# Patient Record
Sex: FEMALE | Race: WHITE | Employment: FULL TIME | URBAN - METROPOLITAN AREA
[De-identification: names, ages, dates, MRNs, and addresses within clinical notes are randomized per-mention and may not be internally consistent; named-entity substitution may affect disease eponyms.]

---

## 2017-03-13 ENCOUNTER — HOSPITAL ENCOUNTER (OUTPATIENT)
Dept: PHYSICAL THERAPY | Age: 27
Discharge: HOME | End: 2017-03-13
Payer: COMMERCIAL

## 2017-03-13 PROCEDURE — 97161 PT EVAL LOW COMPLEX 20 MIN: CPT | Performed by: PHYSICAL THERAPIST

## 2017-03-13 PROCEDURE — 97110 THERAPEUTIC EXERCISES: CPT | Performed by: PHYSICAL THERAPIST

## 2017-03-13 NOTE — PROGRESS NOTES
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] Treatment Diagnosis: Pain in right foot [M79.671]  Pain in right ankle [M25.571]  Ganglion, 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   ===========================================================================================  Assessment / key information:  The pt is a 22 y/o female who presents s/p Kidner Procedure to the R foot to remove extra tarsal, navicular bones and move tendons in foot. Pt states and MD writes on order to mold orthotic for pt to help preserve tendon integrity. The pt presents today with B axillary crutches, boot on R with shelf orthotic, and weight bears approx <10%. Called MD's office who state that pt is OK to begin weight bearing with boot,  Subtalar motion and sagittal plane exercises, avoid active inversion.   The pt presents with mild edema to R B malleoli, mild scar stiffness B, limited ROM, strength to R ankle grossly 3/5 without testing inversion, and LE is grossly 4/5. The pt would benefit from skilled PT services to progress to normal ambulation, increase strength to be able to resume prior ADL's.  ===========================================================================================  Eval Complexity: History LOW Complexity : Zero comorbidities / personal factors that will impact the outcome / POC;  Examination  LOW Complexity : 1-2 Standardized tests and measures addressing body structure, function, activity limitation and / or participation in recreation ; Presentation MEDIUM Complexity : Evolving with changing characteristics ; Decision Making MEDIUM Complexity : FOTO score of 26-74; Overall Complexity LOW   Problem List: pain affecting function, decrease ROM, decrease strength, edema affecting function, impaired gait/ balance, decrease ADL/ functional abilitiies, decrease activity tolerance and decrease flexibility/ joint mobility   Treatment Plan may include any combination of the following: Therapeutic exercise, Therapeutic activities, Neuromuscular re-education, Physical agent/modality, Gait/balance training, Manual therapy, Patient education, Self Care training and Functional mobility training. Orthotic molding/fitting. Patient / Family readiness to learn indicated by: asking questions, trying to perform skills and interest  Persons(s) to be included in education: patient (P)  Barriers to Learning/Limitations: None  Measures taken:    Patient Goal (s): Walk, move foot/ankle. Patient self reported health status: good  Rehabilitation Potential: good   Short Term Goals: To be accomplished in  2-3  weeks:  1. Independent  with HEP. 2. The patient will demonstrate 10 degrees increased ankle DF from -2 degrees to 8 degrees for improved gait pattern. 3. The patient to report maximum R foot pain to be a 2/10 and will be full weight bearing with boot and no crutches.   4. The patient will have increased functional score per FOTO  by 12  points for improved gait tolerance.  Long Term Goals: To be accomplished in  6  weeks:  1. The patient will have decreased pain to a 1-2/10 maximum to be able to walk for return to work  2. The patient will have WFL ROM left foot for ability to demonstrate heel strike and equal step length for a normal gait pattern. 3. The patient to have  Increased strength to > 4+/5 for community ambulation. 4. The patient will have an increased score on FOTO by 24  points for increased ADL tolerance with decreased pain. Frequency / Duration:   Patient to be seen  2  times per week for 6-8  weeks:  Patient / Caregiver education and instruction: self care, activity modification, brace/ splint application and exercises  G-Codes (GP):   Therapist Signature: Arsen Zafar PT Date: 2/50/7715   Certification Period:  Time: 11:20 AM   ===========================================================================================  I certify that the above Physical Therapy Services are being furnished while the patient is under my care. I agree with the treatment plan and certify that this therapy is necessary. Physician Signature:        Date:       Time:     Please sign and return to [facility] or you may fax the signed copy to [phone]. Thank you.

## 2017-03-13 NOTE — PROGRESS NOTES
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 NO    If yes, update Summary List   Subjective Functional Status/Changes:  []  No changes reported     See IE       OBJECTIVE  Modalities Rationale:     decrease edema, decrease inflammation and decrease pain to improve patient's ability to stand/walk with manageable pain. min [] Estim, type/location:                                      []  att     []  unatt     []  w/US     []  w/ice    []  w/heat    min []  Mechanical Traction: type/lbs                   []  pro   []  sup   []  int   []  cont    []  before manual    []  after manual    min []  Ultrasound, settings/location:      min []  Iontophoresis w/ dexamethasone, location:                                               []  take home patch       []  in clinic   10 min [x]  Ice     []  Heat    location/position: R ankle/ supine    min []  Vasopneumatic Device, press/temp:     min []  Other:    [] Skin assessment post-treatment (if applicable):    [x]  intact    []  redness- no adverse reaction     []redness  adverse reaction:          10 min Therapeutic Exercise:  [x]  See flow sheet   Rationale:      increase ROM and increase strength to improve the patients ability to ability to ambulate without crutches.           min Gait Training: Rationale:         min Patient Education:  YES  Reviewed HEP   []  Progressed/Changed HEP based on: Other Objective/Functional Measures:    See eval     Post Treatment Pain Level (on 0 to 10) scale:   0  / 10     ASSESSMENT  Assessment/Changes in Function:     See PN     []  See Progress Note/Recertification   Patient will continue to benefit from skilled PT services to modify and progress therapeutic interventions, address functional mobility deficits, address ROM deficits, address strength deficits, analyze and address soft tissue restrictions, analyze and cue movement patterns and instruct in home and community integration to attain remaining goals. Progress toward goals / Updated goals:    See pn     PLAN  [x]  Upgrade activities as tolerated YES Continue plan of care   []  Discharge due to :    [x]  Other: Progress weight bearing with boot on.  Mold orthotic     Therapist: Arsen aZfar PT    Date: 3/13/2017 Time: 2:46 PM     Future Appointments  Date Time Provider Lita Nguyen   3/15/2017 3:00 PM Eduardo Torres [place] [facility] AT [address]   3/20/2017 12:30 PM Eduardo Torres [facility] AT [address]

## 2017-03-15 ENCOUNTER — HOSPITAL ENCOUNTER (OUTPATIENT)
Dept: PHYSICAL THERAPY | Age: 27
Discharge: HOME | End: 2017-03-15
Payer: COMMERCIAL

## 2017-03-15 PROCEDURE — 97140 MANUAL THERAPY 1/> REGIONS: CPT

## 2017-03-15 PROCEDURE — 97110 THERAPEUTIC EXERCISES: CPT

## 2017-03-15 NOTE — PROGRESS NOTES
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     NO    If yes, update Summary List   Subjective Functional Status/Changes:  []  No changes reported     Functional improvements: its felt fine in the brace,   Functional impairments:  just really tight when its not, only putting around 20 pounds weight bearing        OBJECTIVE  Modalities Rationale:     decrease edema, decrease inflammation and decrease pain to improve patient's ability to prolong walking   min [] Estim, type/location:                                      []  att     []  unatt     []  w/US     []  w/ice    []  w/heat    min []  Mechanical Traction: type/lbs                   []  pro   []  sup   []  int   []  cont    []  before manual    []  after manual    min []  Ultrasound, settings/location:      min []  Iontophoresis w/ dexamethasone, location:                                               []  take home patch       []  in clinic   10 min [x]  Ice     []  Heat    location/position:     min []  Vasopneumatic Device, press/temp:     min []  Other:    [] Skin assessment post-treatment (if applicable):    []  intact    []  redness- no adverse reaction     []redness  adverse reaction:        15 min Manual Therapy: Technique:      [x] S/DTM [x]IASTM [x]PROM [] Passive Stretching   []manual TPR    []Jt manipulation:Gr I [] II []  III [] IV[] V[]  Treatment Area: Ankle PROM   Rationale:      decrease pain, increase ROM, increase tissue extensibility and decrease edema  to improve patient's ability to prolong walking    30 min Therapeutic Exercise:  [x]  See flow sheet   Rationale:      increase ROM, increase strength and improve coordination to improve the patients ability to prolong walking         min Neuromuscular Re-ed:    Rationale:    increase ROM to improve the patients ability to          min Therapeutic Activity:    Rationale:    increase ROM to improve the patients ability to         min Gait Training:    Rationale:         min Patient Education:  YES  Reviewed HEP   []  Progressed/Changed HEP based on: Other Objective/Functional Measures: Added ankle iso, gastroc stretch with strap, marble , hip 3 way      Post Treatment Pain Level (on 0 to 10) scale:   0  / 10     ASSESSMENT  Assessment/Changes in Function:     Patient tolerated treatment well today     []  See Progress Note/Recertification   Patient will continue to benefit from skilled PT services to modify and progress therapeutic interventions, address functional mobility deficits, address ROM deficits and address strength deficits to attain remaining goals.    Progress toward goals / Updated goals:    Established initial HEP     PLAN  []  Upgrade activities as tolerated YES Continue plan of care   []  Discharge due to :    []  Other:      Therapist: Yamila France PTA    Date: 3/15/2017 Time: 5:56 PM     Future Appointments  Date Time Provider Lita Nguyen   3/20/2017 12:30 PM Yamila France PTA [facility] AT [facility]   3/22/2017 3:00 PM Yamila France PTA [facility] AT [facility]

## 2017-03-20 ENCOUNTER — HOSPITAL ENCOUNTER (OUTPATIENT)
Dept: PHYSICAL THERAPY | Age: 27
Discharge: HOME | End: 2017-03-20
Payer: COMMERCIAL

## 2017-03-20 PROCEDURE — 97110 THERAPEUTIC EXERCISES: CPT

## 2017-03-20 PROCEDURE — 97140 MANUAL THERAPY 1/> REGIONS: CPT

## 2017-03-20 NOTE — PROGRESS NOTES
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     NO    If yes, update Summary List   Subjective Functional Status/Changes:  []  No changes reported     Functional improvements: its felt fine in the brace,   Functional impairments:  just really tight when its not, only putting around 20 pounds weight bearing        OBJECTIVE  Modalities Rationale:     decrease edema, decrease inflammation and decrease pain to improve patient's ability to prolong walking   min [] Estim, type/location:                                      []  att     []  unatt     []  w/US     []  w/ice    []  w/heat    min []  Mechanical Traction: type/lbs                   []  pro   []  sup   []  int   []  cont    []  before manual    []  after manual    min []  Ultrasound, settings/location:      min []  Iontophoresis w/ dexamethasone, location:                                               []  take home patch       []  in clinic   10 min [x]  Ice     []  Heat    location/position:     min []  Vasopneumatic Device, press/temp:     min []  Other:    [] Skin assessment post-treatment (if applicable):    []  intact    []  redness- no adverse reaction     []redness  adverse reaction:        15 min Manual Therapy: Technique:      [x] S/DTM [x]IASTM [x]PROM [] Passive Stretching []manual TPR    []Jt manipulation:Gr I [] II []  III [] IV[] V[]  Treatment Area: Ankle PROM   Rationale:      decrease pain, increase ROM, increase tissue extensibility and decrease edema  to improve patient's ability to prolong walking    25 min Therapeutic Exercise:  [x]  See flow sheet   Rationale:      increase ROM, increase strength and improve coordination to improve the patients ability to prolong walking         min Neuromuscular Re-ed:    Rationale:    increase ROM to improve the patients ability to          min Therapeutic Activity:    Rationale:    increase ROM to improve the patients ability to         min Gait Training:    Rationale:         min Patient Education:  YES  Reviewed HEP   []  Progressed/Changed HEP based on: Other Objective/Functional Measures: Added ankle iso, gastroc stretch with strap, marble , hip 3 way      Post Treatment Pain Level (on 0 to 10) scale:   0  / 10     ASSESSMENT  Assessment/Changes in Function:     Patient tolerated treatment well today     []  See Progress Note/Recertification   Patient will continue to benefit from skilled PT services to modify and progress therapeutic interventions, address functional mobility deficits, address ROM deficits and address strength deficits to attain remaining goals.    Progress toward goals / Updated goals:    Established initial HEP     PLAN  []  Upgrade activities as tolerated YES Continue plan of care   []  Discharge due to :    []  Other:      Therapist: Edwar Mcclendon PTA    Date: 3/20/2017 Time: 5:56 PM     Future Appointments  Date Time Provider Lita Nguyen   3/22/2017 3:00 PM Edwar Mcclendon PTA [facility] AT [facility]

## 2017-03-22 ENCOUNTER — HOSPITAL ENCOUNTER (OUTPATIENT)
Dept: PHYSICAL THERAPY | Age: 27
Discharge: HOME | End: 2017-03-22
Payer: COMMERCIAL

## 2017-03-22 PROCEDURE — 97140 MANUAL THERAPY 1/> REGIONS: CPT

## 2017-03-22 PROCEDURE — 97110 THERAPEUTIC EXERCISES: CPT

## 2017-03-22 NOTE — PROGRESS NOTES
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     NO    If yes, update Summary List   Subjective Functional Status/Changes:  []  No changes reported     Functional improvements: its felt fine in the brace,   Functional impairments:  just really tight when its not, only putting around 20 pounds weight bearing        OBJECTIVE  Modalities Rationale:     decrease edema, decrease inflammation and decrease pain to improve patient's ability to prolong walking   min [] Estim, type/location:                                      []  att     []  unatt     []  w/US     []  w/ice    []  w/heat    min []  Mechanical Traction: type/lbs                   []  pro   []  sup   []  int   []  cont    []  before manual    []  after manual    min []  Ultrasound, settings/location:      min []  Iontophoresis w/ dexamethasone, location:                                               []  take home patch       []  in clinic   10 min [x]  Ice     []  Heat    location/position:     min []  Vasopneumatic Device, press/temp:     min []  Other:    [] Skin assessment post-treatment (if applicable):    []  intact    []  redness- no adverse reaction     []redness  adverse reaction:        15 min Manual Therapy: Technique:      [x] S/DTM [x]IASTM [x]PROM [] Passive Stretching   []manual TPR    []Jt manipulation:Gr I [] II []  III [] IV[] V[]  Treatment Area: Ankle PROM   Rationale:      decrease pain, increase ROM, increase tissue extensibility and decrease edema  to improve patient's ability to prolong walking    20 min Therapeutic Exercise:  [x]  See flow sheet   Rationale:      increase ROM, increase strength and improve coordination to improve the patients ability to prolong walking         min Neuromuscular Re-ed:    Rationale:    increase ROM to improve the patients ability to          min Therapeutic Activity:    Rationale:    increase ROM to improve the patients ability to         min Gait Training:    Rationale:         min Patient Education:  YES  Reviewed HEP   []  Progressed/Changed HEP based on: Other Objective/Functional Measures:    Decreased swelling      Post Treatment Pain Level (on 0 to 10) scale:   0  / 10     ASSESSMENT  Assessment/Changes in Function:     Patient tolerated treatment well today     []  See Progress Note/Recertification   Patient will continue to benefit from skilled PT services to modify and progress therapeutic interventions, address functional mobility deficits, address ROM deficits and address strength deficits to attain remaining goals.    Progress toward goals / Updated goals:    Established initial HEP     PLAN  []  Upgrade activities as tolerated YES Continue plan of care   []  Discharge due to :    []  Other:      Therapist: Chloe Jacob PTA    Date: 3/22/2017 Time: 5:56 PM     Future Appointments  Date Time Provider Lita Nguyen   3/27/2017 2:30 PM Aron Fry [facility]   3/29/2017 2:00 PM Chloe Jacob PTA [facility] AT [facility]

## 2017-03-27 ENCOUNTER — HOSPITAL ENCOUNTER (OUTPATIENT)
Dept: PHYSICAL THERAPY | Age: 27
Discharge: HOME | End: 2017-03-27
Payer: COMMERCIAL

## 2017-03-27 PROCEDURE — 97762 HC PT ORTHOTIC/PROS CHECKOUT: CPT

## 2017-03-27 PROCEDURE — 97110 THERAPEUTIC EXERCISES: CPT

## 2017-03-27 PROCEDURE — 97140 MANUAL THERAPY 1/> REGIONS: CPT

## 2017-03-27 NOTE — PROGRESS NOTES
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     NO    If yes, update Summary List   Subjective Functional Status/Changes:  []  No changes reported     Functional improvements:usually wear [employer] Pegasus and will be   Functional impairments:  just really tight when its not, only putting around 20 pounds weight bearing        OBJECTIVE  Modalities Rationale:     decrease edema, decrease inflammation and decrease pain to improve patient's ability to prolong walking   min [] Estim, type/location:                                      []  att     []  unatt     []  w/US     []  w/ice    []  w/heat    min []  Mechanical Traction: type/lbs                   []  pro   []  sup   []  int   []  cont    []  before manual    []  after manual    min []  Ultrasound, settings/location:      min []  Iontophoresis w/ dexamethasone, location:                                               []  take home patch       []  in clinic   10 min [x]  Ice     []  Heat    location/position: supine    min []  Vasopneumatic Device, press/temp:     min []  Other:    [] Skin assessment post-treatment (if applicable):    []  intact    []  redness- no adverse reaction     []redness  adverse reaction:        15 min Manual Therapy: Technique:      [x] S/DTM [x]IASTM [x]PROM [] Passive Stretching   []manual TPR    []Jt manipulation:Gr I [] II []  III [] IV[] V[]  Treatment Area: Ankle PROM   Rationale:      decrease pain, increase ROM, increase tissue extensibility and decrease edema  to improve patient's ability to prolong walking    30 min Therapeutic Exercise:  [x]  See flow sheet   Rationale:      increase ROM, increase strength and improve coordination to improve the patients ability to prolong walking        15 min Patient Education:  YES  Reviewed HEP--Orthotic check out   []  Progressed/Changed HEP based on: Other Objective/Functional Measures:  Pt initial assessment for orthotic: order 3/4 length and full length blue density Vasyli for semi-custom heat molded orthotic. Decreased pain with AROM DF, minimal eversion with pain at [facility]. Post Treatment Pain Level (on 0 to 10) scale:   0  / 10     ASSESSMENT  Assessment/Changes in Function:     Pt with initial fitting for orthotic for R foot. []  See Progress Note/Recertification   Patient will continue to benefit from skilled PT services to modify and progress therapeutic interventions, address functional mobility deficits, address ROM deficits and address strength deficits to attain remaining goals.    Progress toward goals / Updated goals:    Progressed HEP     PLAN  []  Upgrade activities as tolerated YES Continue plan of care   []  Discharge due to :    []  Other:      Therapist: Briana Scott PT    Date: 3/27/2017 Time: 3 PM     Future Appointments  Date Time Provider Lita Nguyne   4/10/2017 12:00 PM Albino Ennis PTA [facility]   4/12/2017 9:30 AM Albino Ennis PTA [facility] AT [facility]   4/17/2017 10:30 AM Albino Ennis PTA [facility] AT [facility]   4/19/2017 9:00 AM Briana Scott PT [facility] AT [facility]

## 2017-03-29 ENCOUNTER — APPOINTMENT (OUTPATIENT)
Dept: PHYSICAL THERAPY | Age: 27
End: 2017-03-29
Payer: COMMERCIAL

## 2017-04-03 ENCOUNTER — HOSPITAL ENCOUNTER (OUTPATIENT)
Dept: PHYSICAL THERAPY | Age: 27
Discharge: HOME | End: 2017-04-03
Payer: COMMERCIAL

## 2017-04-03 PROCEDURE — 97110 THERAPEUTIC EXERCISES: CPT

## 2017-04-03 PROCEDURE — 97140 MANUAL THERAPY 1/> REGIONS: CPT

## 2017-04-03 NOTE — PROGRESS NOTES
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     NO    If yes, update Summary List   Subjective Functional Status/Changes:  []  No changes reported     Functional improvements: its felt fine in the brace,   Functional impairments:  just really tight when its not, only putting around 20 pounds weight bearing        OBJECTIVE  Modalities Rationale:     decrease edema, decrease inflammation and decrease pain to improve patient's ability to prolong walking   min [] Estim, type/location:                                      []  att     []  unatt     []  w/US     []  w/ice    []  w/heat    min []  Mechanical Traction: type/lbs                   []  pro   []  sup   []  int   []  cont    []  before manual    []  after manual    min []  Ultrasound, settings/location:      min []  Iontophoresis w/ dexamethasone, location:                                               []  take home patch       []  in clinic   10 min [x]  Ice     []  Heat    location/position:     min []  Vasopneumatic Device, press/temp:     min []  Other:    [] Skin assessment post-treatment (if applicable):    []  intact    []  redness- no adverse reaction     []redness  adverse reaction:        15 min Manual Therapy: Technique:      [x] S/DTM [x]IASTM [x]PROM [] Passive Stretching []manual TPR    []Jt manipulation:Gr I [] II []  III [] IV[] V[]  Treatment Area: Ankle PROM   Rationale:      decrease pain, increase ROM, increase tissue extensibility and decrease edema  to improve patient's ability to prolong walking    20 min Therapeutic Exercise:  [x]  See flow sheet   Rationale:      increase ROM, increase strength and improve coordination to improve the patients ability to prolong walking         min Neuromuscular Re-ed:    Rationale:    increase ROM to improve the patients ability to          min Therapeutic Activity:    Rationale:    increase ROM to improve the patients ability to         min Gait Training:    Rationale:         min Patient Education:  YES  Reviewed HEP   []  Progressed/Changed HEP based on: Other Objective/Functional Measures:    Decreased swelling      Post Treatment Pain Level (on 0 to 10) scale:   0  / 10     ASSESSMENT  Assessment/Changes in Function:     Patient tolerated treatment well today     []  See Progress Note/Recertification   Patient will continue to benefit from skilled PT services to modify and progress therapeutic interventions, address functional mobility deficits, address ROM deficits and address strength deficits to attain remaining goals.    Progress toward goals / Updated goals:    Established initial HEP     PLAN  []  Upgrade activities as tolerated YES Continue plan of care   []  Discharge due to :    []  Other:      Therapist: Cory Chin PTA    Date: 4/3/2017 Time: 5:56 PM     Future Appointments  Date Time Provider Lita Nguyen   4/5/2017 4:00  [facility], PT REHAB CENTER AT [facility]

## 2017-04-05 ENCOUNTER — APPOINTMENT (OUTPATIENT)
Dept: PHYSICAL THERAPY | Age: 27
End: 2017-04-05
Payer: COMMERCIAL

## 2017-04-06 ENCOUNTER — HOSPITAL ENCOUNTER (OUTPATIENT)
Dept: PHYSICAL THERAPY | Age: 27
Discharge: HOME | End: 2017-04-06
Payer: COMMERCIAL

## 2017-04-06 PROCEDURE — 97110 THERAPEUTIC EXERCISES: CPT

## 2017-04-06 PROCEDURE — 97140 MANUAL THERAPY 1/> REGIONS: CPT

## 2017-04-06 PROCEDURE — 97762 HC PT ORTHOTIC/PROS CHECKOUT: CPT

## 2017-04-06 NOTE — PROGRESS NOTES
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 NO    If yes, update Summary List   Subjective Functional Status/Changes:  []  No changes reported     Functional improvements:progressing well and saw MD, 1 week transition to ankle brace, 4-6 in brace prior to shoe. Functional impairments: restrictions in ROM, not back to work yet--may be back this weekend.        OBJECTIVE  Modalities Rationale:     decrease edema, decrease inflammation and decrease pain to improve patient's ability to prolong walking   min [] Estim, type/location:                                      []  att     []  unatt     []  w/US     []  w/ice    []  w/heat    min []  Mechanical Traction: type/lbs                   []  pro   []  sup   []  int   []  cont    []  before manual    []  after manual    min []  Ultrasound, settings/location:      min []  Iontophoresis w/ dexamethasone, location:                                               []  take home patch       []  in clinic   10 min [x]  Ice     []  Heat    location/position: supine    min []  Vasopneumatic Device, press/temp:     min []  Other:    [x] Skin assessment post-treatment (if applicable):    [x]  intact    [x]  redness- no adverse reaction     []redness  adverse reaction:        15 min Manual Therapy: Technique: [x] S/DTM [x]IASTM [x]PROM [] Passive Stretching   [x]manual TPR    []Jt manipulation:Gr I [] II []  III [] IV[] V[]  Treatment Area: Ankle PROM   Rationale:      decrease pain, increase ROM, increase tissue extensibility and decrease edema  to improve patient's ability to prolong walking, improve gt and standing mechanics    30 min Therapeutic Exercise:  [x]  See flow sheet   Rationale:      increase ROM, increase strength and improve coordination to improve the patients ability to prolong walking duration         min Neuromuscular Re-ed:    Rationale:    increase ROM to improve the patients ability to          min Therapeutic Activity:    Rationale:    increase ROM to improve the patients ability to         min Gait Training:    Rationale:        15 min Orthotic check out:  YES  Reviewed HEP   []  Progressed/Changed HEP based on: Other Objective/Functional Measures:    Issued blue full-length and 3/4 length for L, R foot respectively. Pt with ed on wear schedule. Advanced to bike and ed on transitioning to brace. Post Treatment Pain Level (on 0 to 10) scale:   0  / 10     ASSESSMENT  Assessment/Changes in Function:     Pt responded well to bike for ROM, orthotic fitting. []  See Progress Note/Recertification   Patient will continue to benefit from skilled PT services to modify and progress therapeutic interventions, address functional mobility deficits, address ROM deficits and address strength deficits to attain remaining goals. Progress toward goals / Updated goals:    Excellent progression with WB, limited significantly with scar tissue, ROM.      PLAN  [x]  Upgrade activities as tolerated YES Continue plan of care   []  Discharge due to :    []  Other:      Therapist: Juve Lee PT    Date: 4/6/2017 Time: 12 PM     Future Appointments  Date Time Provider Lita Nguyen   4/10/2017 12:00 PM Elsa Acevedo PTA REHAB CENTER AT [facility]   4/12/2017 9:30 AM Elsa Acevedo PTA REHAB CENTER AT [facility]   4/17/2017 10:30 AM tSella Pagan, PTA REHAB CENTER AT [facility]   4/19/2017 9:00 AM Shanique Chandra, PT REHAB CENTER AT [facility]

## 2017-04-10 ENCOUNTER — HOSPITAL ENCOUNTER (OUTPATIENT)
Dept: PHYSICAL THERAPY | Age: 27
Discharge: HOME | End: 2017-04-10
Payer: COMMERCIAL

## 2017-04-10 PROCEDURE — 97110 THERAPEUTIC EXERCISES: CPT

## 2017-04-10 PROCEDURE — 97140 MANUAL THERAPY 1/> REGIONS: CPT

## 2017-04-10 NOTE — PROGRESS NOTES
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     NO    If yes, update Summary List   Subjective Functional Status/Changes:  []  No changes reported     Functional improvements:  Brace is going well  Functional impairments:  just really tight when its not, only putting arourd 80%        OBJECTIVE  Modalities Rationale:     decrease edema, decrease inflammation and decrease pain to improve patient's ability to prolong walking   min [] Estim, type/location:                                      []  att     []  unatt     []  w/US     []  w/ice    []  w/heat    min []  Mechanical Traction: type/lbs                   []  pro   []  sup   []  int   []  cont    []  before manual    []  after manual    min []  Ultrasound, settings/location:      min []  Iontophoresis w/ dexamethasone, location:                                               []  take home patch       []  in clinic   10 min [x]  Ice     []  Heat    location/position:     min []  Vasopneumatic Device, press/temp:     min []  Other:    [] Skin assessment post-treatment (if applicable):    []  intact    []  redness- no adverse reaction     []redness  adverse reaction:        20 min Manual Therapy: Technique:      [x] S/DTM [x]IASTM [x]PROM [] Passive Stretching   []manual TPR    []Jt manipulation:Gr I [] II []  III [] IV[] V[]  Treatment Area: Ankle PROM   Rationale:      decrease pain, increase ROM, increase tissue extensibility and decrease edema  to improve patient's ability to prolong walking    40 min Therapeutic Exercise:  [x]  See flow sheet   Rationale:      increase ROM, increase strength and improve coordination to improve the patients ability to prolong walking         min Neuromuscular Re-ed:    Rationale:    increase ROM to improve the patients ability to          min Therapeutic Activity:    Rationale:    increase ROM to improve the patients ability to         min Gait Training:    Rationale:         min Patient Education:  YES  Reviewed HEP   []  Progressed/Changed HEP based on: Other Objective/Functional Measures:         Post Treatment Pain Level (on 0 to 10) scale:   0  / 10     ASSESSMENT  Assessment/Changes in Function:     Patient tolerated treatment well today     []  See Progress Note/Recertification   Patient will continue to benefit from skilled PT services to modify and progress therapeutic interventions, address functional mobility deficits, address ROM deficits and address strength deficits to attain remaining goals.    Progress toward goals / Updated goals:    Established initial HEP     PLAN  []  Upgrade activities as tolerated YES Continue plan of care   []  Discharge due to :    []  Other:      Therapist: Julio Aguilar PTA    Date: 4/10/2017 Time: 5:56 PM     Future Appointments  Date Time Provider Lita Nguyen   4/12/2017 9:30 AM Julio Aguilar PTA REHAB CENTER AT [facility]   4/17/2017 10:30 AM Julio Aguilar PTA REHAB CENTER AT [facility]   4/19/2017 9:00 AM [address] [place] [address], PT REHAB CENTER AT [facility]

## 2017-04-12 ENCOUNTER — HOSPITAL ENCOUNTER (OUTPATIENT)
Dept: PHYSICAL THERAPY | Age: 27
Discharge: HOME | End: 2017-04-12
Payer: COMMERCIAL

## 2017-04-12 PROCEDURE — 97112 NEUROMUSCULAR REEDUCATION: CPT

## 2017-04-12 PROCEDURE — 97110 THERAPEUTIC EXERCISES: CPT

## 2017-04-12 PROCEDURE — 97140 MANUAL THERAPY 1/> REGIONS: CPT

## 2017-04-17 ENCOUNTER — HOSPITAL ENCOUNTER (OUTPATIENT)
Dept: PHYSICAL THERAPY | Age: 27
Discharge: HOME | End: 2017-04-17
Payer: COMMERCIAL

## 2017-04-17 PROCEDURE — 97110 THERAPEUTIC EXERCISES: CPT

## 2017-04-17 PROCEDURE — 97140 MANUAL THERAPY 1/> REGIONS: CPT

## 2017-04-17 NOTE — PROGRESS NOTES
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     NO    If yes, update Summary List   Subjective Functional Status/Changes:  []  No changes reported     Functional improvements:  Brace is going well  Functional impairments:  just really tight when its not, only putting arourd 80%        OBJECTIVE  Modalities Rationale:     decrease edema, decrease inflammation and decrease pain to improve patient's ability to prolong walking   min [] Estim, type/location:                                      []  att     []  unatt     []  w/US     []  w/ice    []  w/heat    min []  Mechanical Traction: type/lbs                   []  pro   []  sup   []  int   []  cont    []  before manual    []  after manual    min []  Ultrasound, settings/location:      min []  Iontophoresis w/ dexamethasone, location:                                               []  take home patch       []  in clinic   10 min [x]  Ice     []  Heat    location/position:     min []  Vasopneumatic Device, press/temp:     min []  Other:    [] Skin assessment post-treatment (if applicable):    []  intact    []  redness- no adverse reaction     []redness  adverse reaction:        15 min Manual Therapy: Technique:      [x] S/DTM [x]IASTM [x]PROM [] Passive Stretching   []manual TPR    []Jt manipulation:Gr I [] II []  III [] IV[] V[]  Treatment Area: Ankle PROM   Rationale:      decrease pain, increase ROM, increase tissue extensibility and decrease edema  to improve patient's ability to prolong walking    35 min Therapeutic Exercise:  [x]  See flow sheet   Rationale:      increase ROM, increase strength and improve coordination to improve the patients ability to prolong walking         min Neuromuscular Re-ed:    Rationale:    increase ROM to improve the patients ability to          min Therapeutic Activity:    Rationale:    increase ROM to improve the patients ability to         min Gait Training:    Rationale:         min Patient Education:  YES  Reviewed HEP   []  Progressed/Changed HEP based on: Other Objective/Functional Measures: Added SR     Post Treatment Pain Level (on 0 to 10) scale:   0  / 10     ASSESSMENT  Assessment/Changes in Function:     Patient tolerated treatment well today     []  See Progress Note/Recertification   Patient will continue to benefit from skilled PT services to modify and progress therapeutic interventions, address functional mobility deficits, address ROM deficits and address strength deficits to attain remaining goals.    Progress toward goals / Updated goals:    Established initial HEP     PLAN  []  Upgrade activities as tolerated YES Continue plan of care   []  Discharge due to :    []  Other:      Therapist: Krystin Castellon PTA    Date: 4/17/2017 Time: 5:56 PM     Future Appointments  Date Time Provider Lita Nguyen   4/19/2017 9:00  [address], PT REHAB CENTER AT [facility]   4/25/2017 12:45 PM Katlin Robles, PT REHAB CENTER AT [facility]

## 2017-04-19 ENCOUNTER — HOSPITAL ENCOUNTER (OUTPATIENT)
Dept: PHYSICAL THERAPY | Age: 27
Discharge: HOME | End: 2017-04-19
Payer: COMMERCIAL

## 2017-04-19 PROCEDURE — 97110 THERAPEUTIC EXERCISES: CPT

## 2017-04-19 PROCEDURE — 97140 MANUAL THERAPY 1/> REGIONS: CPT

## 2017-04-19 PROCEDURE — 97112 NEUROMUSCULAR REEDUCATION: CPT

## 2017-04-19 NOTE — PROGRESS NOTES
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 Manual Augmented soft tissue massage and trigger point release   Right ankle PROM all planes     CURRENT STATUS  Patient is progressing well toward all set goals. Pt initial assessment for orthotic: order 3/4 length and full length blue density Vasyli for semi-custom heat molded orthotic. Previous Goals:  1. Independent with HEP. 2. The patient will demonstrate 10 degrees increased ankle DF from -2 degrees to 8 degrees for improved gait pattern. 3. The patient to report maximum R foot pain to be a 2/10 and will be full weight bearing with boot and no crutches. 4. The patient will have increased functional score per FOTO by 12  points for improved gait tolerance. Prior Level/Current Level:  1) Prior Level:     Current Level: states compliance and independence with HEP   Goal Met? yes  2) Prior Level: -2 degree DF   Current Level: 10 degrees DF   Goal Met? yes  3) Prior Level: painful and non weightbearing    Current Level: no pain reported, 100% weightbearing in brace   Goal Met? yes  4) Prior Level: 30   Current Level: n/a   Goal Met? n/a    New Goals to be achieved in __4__  weeks:  1. The patient will have decreased pain to a 1-2/10 maximum to be able to walk for return to work  2.  The patient will have WFL ROM left foot for ability to demonstrate heel strike and equal step length for a normal gait pattern. 3. The patient to have Increased strength to > 4+/5 for community ambulation. 4. The patient will have an increased score on FOTO by 24  points for increased ADL tolerance with decreased pain. RECOMMENDATIONS  Continue per current POC to assist with increasing function while returning to previous ADLs. 2-3x/week for 4 weeks  If you have any questions/comments please contact us directly at [phone]. Thank you for allowing us to assist in the care of your patient. Therapist Signature: Mitchael Dakin, PTA Date: 4/19/2017     Time: 12:15 PM   NOTE TO PHYSICIAN:  PLEASE COMPLETE THE ORDERS BELOW AND FAX TO   [facility] at [address]: [phone]. If you are unable to process this request in 24 hours please contact our office: [phone].    ___ I have read the above report and request that my patient continue as recommended.   ___ I have read the above report and request that my patient continue therapy with the following changes/special instructions:_________________________________________________________   ___ I have read the above report and request that my patient be discharged from therapy.      Physician Signature:        Date:       Time:

## 2017-04-19 NOTE — PROGRESS NOTES
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     NO    If yes, update Summary List   Subjective Functional Status/Changes:  []  No changes reported     Functional improvements:  Brace is going well  Functional impairments:  just really tight when its not, only putting arourd 80%        OBJECTIVE  Modalities Rationale:     decrease edema, decrease inflammation and decrease pain to improve patient's ability to prolong walking   min [] Estim, type/location:                                      []  att     []  unatt     []  w/US     []  w/ice    []  w/heat    min []  Mechanical Traction: type/lbs                   []  pro   []  sup   []  int   []  cont    []  before manual    []  after manual    min []  Ultrasound, settings/location:      min []  Iontophoresis w/ dexamethasone, location:                                               []  take home patch       []  in clinic    min [x]  Ice     []  Heat    location/position:     min []  Vasopneumatic Device, press/temp:     min []  Other:    [] Skin assessment post-treatment (if applicable):    []  intact    []  redness- no adverse reaction     []redness  adverse reaction:        15 min Manual Therapy: Technique:      [x] S/DTM [x]IASTM [x]PROM [] Passive Stretching   []manual TPR    []Jt manipulation:Gr I [] II []  III [] IV[] V[]  Treatment Area: Ankle PROM   Rationale:      decrease pain, increase ROM, increase tissue extensibility and decrease edema  to improve patient's ability to prolong walking    45 min Therapeutic Exercise:  [x]  See flow sheet   Rationale:      increase ROM, increase strength and improve coordination to improve the patients ability to prolong walking         min Neuromuscular Re-ed:    Rationale:    increase ROM to improve the patients ability to          min Therapeutic Activity:    Rationale:    increase ROM to improve the patients ability to         min Gait Training:    Rationale:         min Patient Education:  YES  Reviewed HEP   []  Progressed/Changed HEP based on: Other Objective/Functional Measures:    PROM WNL  See note     Post Treatment Pain Level (on 0 to 10) scale:   0  / 10     ASSESSMENT  Assessment/Changes in Function:     Patient tolerated treatment well today     []  See Progress Note/Recertification   Patient will continue to benefit from skilled PT services to modify and progress therapeutic interventions, address functional mobility deficits, address ROM deficits and address strength deficits to attain remaining goals.    Progress toward goals / Updated goals:    Established initial HEP     PLAN  []  Upgrade activities as tolerated YES Continue plan of care   []  Discharge due to :    []  Other:      Therapist: Ivy Tamez PTA    Date: 4/19/2017 Time: 5:56 PM     Future Appointments  Date Time Provider Lita Nguyen   4/25/2017 12:45 PM Spring Kohli, PT REHAB CENTER AT [facility]

## 2017-04-25 ENCOUNTER — HOSPITAL ENCOUNTER (OUTPATIENT)
Dept: PHYSICAL THERAPY | Age: 27
Discharge: HOME | End: 2017-04-25
Payer: COMMERCIAL

## 2017-04-25 PROCEDURE — 97110 THERAPEUTIC EXERCISES: CPT | Performed by: PHYSICAL THERAPIST

## 2017-04-25 PROCEDURE — 97140 MANUAL THERAPY 1/> REGIONS: CPT | Performed by: PHYSICAL THERAPIST

## 2017-04-25 NOTE — PROGRESS NOTES
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 NO    If yes, update Summary List   Subjective Functional Status/Changes:  []  No changes reported     Functional improvements: Improved walking  Functional impairments: pain to arch and she thinks it's due to the brace. Feels she's imporving. OBJECTIVE  Modalities Rationale:     decrease edema, decrease inflammation and decrease pain to improve patient's ability to improve ability to prolong walking.    min [] Estim, type/location:                                      []  att     []  unatt     []  w/US     []  w/ice    []  w/heat    min []  Mechanical Traction: type/lbs                   []  pro   []  sup   []  int   []  cont    []  before manual    []  after manual    min []  Ultrasound, settings/location:      min []  Iontophoresis w/ dexamethasone, location:                                               []  take home patch       []  in clinic   10 min [x]  Ice     []  Heat    location/position: R foot    min []  Vasopneumatic Device, press/temp:     min []  Other:    [] Skin assessment post-treatment (if applicable):    []  intact    []  redness- no adverse reaction     []redness  adverse reaction:        15 min Manual Therapy: Technique:      [x] S/DTM []IASTM [x]PROM [] Passive Stretching   []manual TPR    []Jt manipulation:Gr I [] II []  III [] IV[] V[]  Treatment Area: Ankle PROM, scar mobs   Rationale:      decrease pain, increase ROM, increase tissue extensibility and decrease edema  to improve patient's ability to prolong walking. 35 min Therapeutic Exercise:  [x]  See flow sheet   Rationale:      increase ROM and increase strength to improve the patients ability to prolong ablity to walk. min Patient Education:  YES  Reviewed HEP   []  Progressed/Changed HEP based on: Other Objective/Functional Measures:    Increased stability with balance exercises. Post Treatment Pain Level (on 0 to 10) scale:   0  / 10     ASSESSMENT  Assessment/Changes in Function:     The pt is able to perform all exercises without pain. Pain in arch 2/2 brace where air is pumped in to support her. Recommended she decrease that to tolerance. []  See Progress Note/Recertification   Patient will continue to benefit from skilled PT services to modify and progress therapeutic interventions, address functional mobility deficits, address ROM deficits, address strength deficits, analyze and address soft tissue restrictions, analyze and cue movement patterns and analyze and modify body mechanics/ergonomics to attain remaining goals. Progress toward goals / Updated goals:    Progressing towards all LTG's. PLAN  [x]  Upgrade activities as tolerated YES Continue plan of care   []  Discharge due to :    []  Other:      Therapist: Nirav Dempsey PT    Date: 4/25/2017 Time: 1:02 PM     No future appointments.

## 2017-05-02 ENCOUNTER — HOSPITAL ENCOUNTER (OUTPATIENT)
Dept: PHYSICAL THERAPY | Age: 27
Discharge: HOME | End: 2017-05-02
Payer: COMMERCIAL

## 2017-05-02 PROCEDURE — 97140 MANUAL THERAPY 1/> REGIONS: CPT | Performed by: PHYSICAL THERAPIST

## 2017-05-02 PROCEDURE — 97110 THERAPEUTIC EXERCISES: CPT | Performed by: PHYSICAL THERAPIST

## 2017-05-02 NOTE — PROGRESS NOTES
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 NO    If yes, update Summary List   Subjective Functional Status/Changes:  []  No changes reported     Functional improvements: Has been walking at work with brace and shoes and feels ok, adjusted her brace and no longer bothers her. Asks if she can start gentle Yoga. Functional impairments: unable to completely weight bear 100% of weight through foot. OBJECTIVE  Modalities Rationale:     decrease edema, decrease inflammation and decrease pain to improve patient's ability to prolong walking and increase weight bearing.    min [x] Estim, type/location:                                      []  att     []  unatt     []  w/US     []  w/ice    []  w/heat    min []  Mechanical Traction: type/lbs                   []  pro   []  sup   []  int   []  cont    []  before manual    []  after manual    min []  Ultrasound, settings/location:      min []  Iontophoresis w/ dexamethasone, location:                                               []  take home patch       []  in clinic   10 min [x]  Ice     []  Heat    location/position: R foot in hooklying    min []  Vasopneumatic Device, press/temp:     min []  Other:    [] Skin assessment post-treatment (if applicable):    []  intact    []  redness- no adverse reaction     []redness  adverse reaction:        15 min Manual Therapy: Technique:      [x] S/DTM [x]IASTM [x]PROM [] Passive Stretching   []manual TPR    []Jt manipulation:Gr I [] II []  III [] IV[] V[]  Treatment Area: Ankle PROM   Rationale:      decrease pain, increase ROM, increase tissue extensibility and decrease trigger points to improve patient's ability to  improve the patients ability to decrease pain for walking. 35 min Therapeutic Exercise:  [x]  See flow sheet   Rationale:      increase ROM, increase strength and improve coordination to improve the patients ability to  improve the patients ability to decrease pain for walking. min Patient Education:  YES  Reviewed HEP   []  Progressed/Changed HEP based on: Other Objective/Functional Measures:    Corrected technique with standing hip 4 way due to compensating with hip abduction. Post Treatment Pain Level (on 0 to 10) scale:   0  / 10     ASSESSMENT  Assessment/Changes in Function:     Improving ankle ROM, continues to minimize weight to R foot in stance phase. Pt to wear tennis shoes and brace with attempt at pain free Yoga and is to stop if there is pain. []  See Progress Note/Recertification   Patient will continue to benefit from skilled PT services to modify and progress therapeutic interventions, address functional mobility deficits, address ROM deficits, address strength deficits, analyze and address soft tissue restrictions, analyze and cue movement patterns and address imbalance/dizziness to attain remaining goals. Progress toward goals / Updated goals:    1. The patient will have decreased pain to a 1-2/10 maximum to be able to walk for return to work    MET  2. The patient will have WFL ROM left foot for ability to demonstrate heel strike and equal step length for a normal gait pattern. Progressing  3. The patient to have Increased strength to > 4+/5 for community ambulation. progressing  4.  The patient will have an increased score on FOTO by 24 points for increased ADL tolerance with decreased pain  Progressing     PLAN  [x]  Upgrade activities as tolerated YES Continue plan of care   []  Discharge due to :    []  Other:      Therapist: Bev Almanza PT    Date: 5/2/2017 Time: 2:07 PM     Future Appointments  Date Time Provider Lita Nguyen   5/3/2017 9:30 AM [address] [place] [address], PT [facility]HAB [facility]ENTER AT [facility]   5/9/2017 9:30 AM Bev Almanza PT [facility] AT [facility]

## 2017-05-03 ENCOUNTER — APPOINTMENT (OUTPATIENT)
Dept: PHYSICAL THERAPY | Age: 27
End: 2017-05-03
Payer: COMMERCIAL

## 2017-05-09 ENCOUNTER — HOSPITAL ENCOUNTER (OUTPATIENT)
Dept: PHYSICAL THERAPY | Age: 27
Discharge: HOME | End: 2017-05-09
Payer: COMMERCIAL

## 2017-05-09 PROCEDURE — 97140 MANUAL THERAPY 1/> REGIONS: CPT | Performed by: PHYSICAL THERAPIST

## 2017-05-09 PROCEDURE — 97110 THERAPEUTIC EXERCISES: CPT | Performed by: PHYSICAL THERAPIST

## 2017-05-09 NOTE — PROGRESS NOTES
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 NO    If yes, update Summary List   Subjective Functional Status/Changes:  []  No changes reported     Functional improvements: No pain, tolerates walking/standing 12 hour shift  Goes to see surgeon tomorrow.        OBJECTIVE  Modalities Rationale:     decrease edema, decrease inflammation and decrease pain to improve patient's ability to stand   min [] Estim, type/location:                                      []  att     []  unatt     []  w/US     []  w/ice    []  w/heat    min []  Mechanical Traction: type/lbs                   []  pro   []  sup   []  int   []  cont    []  before manual    []  after manual    min []  Ultrasound, settings/location:      min []  Iontophoresis w/ dexamethasone, location:                                               []  take home patch       []  in clinic   10 min [x]  Ice     []  Heat    location/position: R foot    min []  Vasopneumatic Device, press/temp:     min []  Other:    [] Skin assessment post-treatment (if applicable):    []  intact    []  redness- no adverse reaction     []redness  adverse reaction:        15 min Manual Therapy: Technique:      [x] S/DTM []IASTM [x]PROM [] Passive Stretching   []manual TPR    []Jt manipulation:Gr I [] II []  III [] IV[] V[]  Treatment Area: Ankle PROM, STM to ant tib, peroneal, gasatroc/soleus, arch. Rationale:      decrease pain, increase ROM, increase tissue extensibility and decrease trigger points to improve patient's ability to stand    45 min Therapeutic Exercise:  [x]  See flow sheet   Rationale:      increase ROM and increase strength to improve the patients ability to stand        min Patient Education:  YES  Reviewed HEP   []  Progressed/Changed HEP based on: Other Objective/Functional Measures:    Issued pt red tband for tband 4 way HEP. Reassessed and sent with pt letter for doctor. AROM DF=9*, PF=40*, inv=20*, EV=13*     Post Treatment Pain Level (on 0 to 10) scale:   0  / 10     ASSESSMENT  Assessment/Changes in Function:     The pt has gained increased ROM and strength. Demos good understanding of HEP. []  See Progress Note/Recertification   Patient will continue to benefit from skilled PT services to modify and progress therapeutic interventions, address functional mobility deficits, address ROM deficits, address strength deficits, analyze and address soft tissue restrictions, analyze and cue movement patterns and analyze and modify body mechanics/ergonomics to attain remaining goals. Progress toward goals / Updated goals:    I with HEP, gained increased DF.      PLAN  [x]  Upgrade activities as tolerated YES Continue plan of care   []  Discharge due to :    []  Other:      Therapist: Preet Morrissey, PT    Date: 5/9/2017 Time: 9:29 AM     Future Appointments  Date Time Provider Lita Nguyen   5/9/2017 9:30 AM Preet Morrissey, PT REHAB CENTER AT [facility]

## 2017-05-16 ENCOUNTER — APPOINTMENT (OUTPATIENT)
Dept: PHYSICAL THERAPY | Age: 27
End: 2017-05-16
Payer: COMMERCIAL

## 2017-05-18 ENCOUNTER — HOSPITAL ENCOUNTER (OUTPATIENT)
Dept: PHYSICAL THERAPY | Age: 27
Discharge: HOME | End: 2017-05-18
Payer: COMMERCIAL

## 2017-05-18 PROCEDURE — 97110 THERAPEUTIC EXERCISES: CPT

## 2017-05-18 PROCEDURE — 97112 NEUROMUSCULAR REEDUCATION: CPT

## 2017-05-18 PROCEDURE — 97140 MANUAL THERAPY 1/> REGIONS: CPT

## 2017-05-18 NOTE — PROGRESS NOTES
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     NO    If yes, update Summary List   Subjective Functional Status/Changes:  []  No changes reported     Saw surgeon who started the walk to run program and was able to progress activity per MD.       OBJECTIVE  Modalities Rationale:     decrease edema, decrease inflammation and decrease pain to improve patient's ability to stand   min [] Estim, type/location:                                      []  att     []  unatt     []  w/US     []  w/ice    []  w/heat    min []  Mechanical Traction: type/lbs                   []  pro   []  sup   []  int   []  cont    []  before manual    []  after manual    min []  Ultrasound, settings/location:      min []  Iontophoresis w/ dexamethasone, location:                                               []  take home patch       []  in clinic   10 min [x]  Ice     []  Heat    location/position: R foot    min []  Vasopneumatic Device, press/temp:     min []  Other:    [x] Skin assessment post-treatment (if applicable):    [x]  intact    [x]  redness- no adverse reaction     []redness  adverse reaction:        15 min Manual Therapy: Technique:      [x] S/DTM []IASTM [x]PROM [] Passive Stretching   []manual TPR    []Jt manipulation:Gr I [] II []  III [] IV[] V[]  Treatment Area: Ankle PROM, STM to ant tib, peroneal, gasatroc/soleus, arch. Rationale:      decrease pain, increase ROM, increase tissue extensibility and decrease trigger points to improve patient's ability to perform heel raise for reaching    30 min Therapeutic Exercise:  [x]  See flow sheet   Rationale:      increase ROM and increase strength to improve the patients ability to stand for work    15 min Neuromuscular Re-education:  [x]  See flow sheet   Rationale:      increase ROM and increase strength to improve the patients ability to balance for recreational activity      T/o tx min Patient Education:  YES  Reviewed HEP   []  Progressed/Changed HEP based on: Other Objective/Functional Measures:    Issued pt green tband for tband 4 way HEP. Added static balance for propriorception. See updated HEP. Post Treatment Pain Level (on 0 to 10) scale:   0  / 10     ASSESSMENT  Assessment/Changes in Function:     Pt challenged with EC activity, unable to perform HR without pain single leg modified for 80/20 for pt home program.   []  See Progress Note/Recertification   Patient will continue to benefit from skilled PT services to modify and progress therapeutic interventions, address functional mobility deficits, address ROM deficits, address strength deficits, analyze and address soft tissue restrictions, analyze and cue movement patterns and analyze and modify body mechanics/ergonomics to attain remaining goals. Progress toward goals / Updated goals:    I with HEP, gained increased DF. PLAN  [x]  Upgrade activities as tolerated YES Continue plan of care   []  Discharge due to :    []  Other:      Therapist: Rocky Pearce PT    Date: 5/18/2017 Time: 115pm     No future appointments.

## 2017-05-23 ENCOUNTER — HOSPITAL ENCOUNTER (OUTPATIENT)
Dept: PHYSICAL THERAPY | Age: 27
Discharge: HOME | End: 2017-05-23
Payer: COMMERCIAL

## 2017-05-23 PROCEDURE — 97110 THERAPEUTIC EXERCISES: CPT

## 2017-05-23 PROCEDURE — 97140 MANUAL THERAPY 1/> REGIONS: CPT

## 2017-05-23 PROCEDURE — 97112 NEUROMUSCULAR REEDUCATION: CPT

## 2017-05-23 NOTE — PROGRESS NOTES
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 NO    If yes, update Summary List   Subjective Functional Status/Changes:  []  No changes reported     Has been running as part of walk to run with less pain than she expected. Pain is less but balance is poor. OBJECTIVE  Modalities Rationale:     decrease edema, decrease inflammation and decrease pain to improve patient's ability to stand   min [] Estim, type/location:                                      []  att     []  unatt     []  w/US     []  w/ice    []  w/heat    min []  Mechanical Traction: type/lbs                   []  pro   []  sup   []  int   []  cont    []  before manual    []  after manual    min []  Ultrasound, settings/location:      min []  Iontophoresis w/ dexamethasone, location:                                               []  take home patch       []  in clinic   10 min [x]  Ice     []  Heat    location/position: R foot in supine with LE elevation.     min []  Vasopneumatic Device, press/temp:     min []  Other:    [x] Skin assessment post-treatment (if applicable):    [x]  intact    [x]  redness- no adverse reaction     []redness  adverse reaction:        15 min Manual Therapy: Technique:      [x] S/DTM []IASTM [x]PROM [] Passive Stretching   []manual TPR    [x]Jt manipulation:Gr I [x] II [x]  III [x] IV[x] V[]  Treatment Area:  GTE mobility, DF mobility, Ankle PROM, STM to ant tib, peroneal, gastroc/soleus, arch. Rationale:      decrease pain, increase ROM, increase tissue extensibility and decrease trigger points to improve patient's ability to perform heel raise for reaching    30 min Therapeutic Exercise:  [x]  See flow sheet   Rationale:      increase ROM and increase strength to improve the patients ability to stand for work    15 min Neuromuscular Re-education:  [x]  See flow sheet   Rationale:      increase ROM and increase strength to improve the patients ability to balance for recreational activity      T/o tx min Patient Education:  YES  Reviewed HEP   []  Progressed/Changed HEP based on: Other Objective/Functional Measures:    Ed on progression of dynamic balance activity. Able to add altered PIETRO for ankle strength and stability with foam and rail. Post Treatment Pain Level (on 0 to 10) scale:   0  / 10     ASSESSMENT  Assessment/Changes in Function:     Pt challenged with progression of balance activity. Pt with improving ROM. []  See Progress Note/Recertification   Patient will continue to benefit from skilled PT services to modify and progress therapeutic interventions, address functional mobility deficits, address ROM deficits, address strength deficits, analyze and address soft tissue restrictions, analyze and cue movement patterns and analyze and modify body mechanics/ergonomics to attain remaining goals. Progress toward goals / Updated goals:    I with progression of HEP, HHT with dynamic and static balance.      PLAN  [x]  Upgrade activities as tolerated YES Continue plan of care   []  Discharge due to :    []  Other:      Therapist: Latasha Caceres PT    Date: 5/23/2017 Time: 125 pm     Future Appointments  Date Time Provider Lita Nguyen   5/30/2017 9:30 AM Latasha Caceres PT REHAB [facility] AT [facility]   6/7/2017 9:30 AM Latahsa Caceres PT REHAB CENTER AT [facility] 6/14/2017 9:30 AM Jackie Garcia PT REHAB CENTER AT [facility]

## 2017-05-30 ENCOUNTER — HOSPITAL ENCOUNTER (OUTPATIENT)
Dept: PHYSICAL THERAPY | Age: 27
Discharge: HOME | End: 2017-05-30
Payer: COMMERCIAL

## 2017-05-30 PROCEDURE — 97140 MANUAL THERAPY 1/> REGIONS: CPT

## 2017-05-30 PROCEDURE — 97110 THERAPEUTIC EXERCISES: CPT

## 2017-05-30 PROCEDURE — 97112 NEUROMUSCULAR REEDUCATION: CPT

## 2017-05-30 NOTE — PROGRESS NOTES
PHYSICAL THERAPY - DAILY TREATMENT NOTE    Patient Name: Von Whitt        Date: 2017  : 1990   YES Patient  Verified  Visit #:   16(6)   of   8  Insurance: Payor: Cheyenne Like / Plan: [address] 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 NO    If yes, update Summary List   Subjective Functional Status/Changes:  []  No changes reported     Is progressing well in the walk to run program.  Running feels good, balance is the most limited still especially with SLS. OBJECTIVE  Modalities Rationale:     decrease edema, decrease inflammation and decrease pain to improve patient's ability to stand   min [] Estim, type/location:                                      []  att     []  unatt     []  w/US     []  w/ice    []  w/heat    min []  Mechanical Traction: type/lbs                   []  pro   []  sup   []  int   []  cont    []  before manual    []  after manual    min []  Ultrasound, settings/location:      min []  Iontophoresis w/ dexamethasone, location:                                               []  take home patch       []  in clinic   10 min [x]  Ice     []  Heat    location/position: R foot in supine with LE elevation.     min []  Vasopneumatic Device, press/temp:     min []  Other:    [x] Skin assessment post-treatment (if applicable):    [x]  intact    [x]  redness- no adverse reaction     []redness  adverse reaction:        15 min Manual Therapy: Technique:      [x] S/DTM []IASTM [x]PROM [] Passive Stretching   []manual TPR    [x]Jt manipulation:Gr I [x] II [x]  III [x] IV[x] V[]  Treatment Area:  GTE mobility, DF mobility, Ankle PROM, STM to ant tib, peroneal, gastroc/soleus, arch. Rationale:      decrease pain, increase ROM, increase tissue extensibility and decrease trigger points to improve patient's ability to perform heel raise for reaching    30 min Therapeutic Exercise:  [x]  See flow sheet   Rationale:      increase ROM and increase strength to improve the patients ability to stand for work    10 min Neuromuscular Re-education:  [x]  See flow sheet   Rationale:      increase ROM and increase strength to improve the patients ability to balance for recreational activity, running. T/o tx min Patient Education:  YES  Reviewed HEP   []  Progressed/Changed HEP based on: Other Objective/Functional Measures:  Pt HEP progression. with addition of lateral step downs. Pt progression with TRX single and double leg movements. SLS with EC < 3 seconds. Post Treatment Pain Level (on 0 to 10) scale:   0  / 10     ASSESSMENT  Assessment/Changes in Function:     Pt challenged with Single leg TRX squats and lunges  Pt with improving ROM. []  See Progress Note/Recertification   Patient will continue to benefit from skilled PT services to modify and progress therapeutic interventions, address functional mobility deficits, address ROM deficits, address strength deficits, analyze and address soft tissue restrictions, analyze and cue movement patterns and analyze and modify body mechanics/ergonomics to attain remaining goals. Progress toward goals / Updated goals:    I with progression of HEP to include lateral step downs. Added SLS with rebounder.   Added slant board stretch for DF and seated gastroc, soleus to improve ROM and running walk program.     PLAN  [x]  Upgrade activities as tolerated YES Continue plan of care   []  Discharge due to :    [x]  Other: Progress walk to run program.     Therapist: Vivian Ernst, PT Date: 5/30/2017 Time: 915am     Future Appointments  Date Time Provider Lita Nguyen   6/7/2017 9:30 AM Pamela Card, PT REHAB [facility]ENTER AT [facility]   6/14/2017 9:30 AM Pamela Card, PT REHAB [facility]ENTER AT [facility]

## 2017-06-07 ENCOUNTER — APPOINTMENT (OUTPATIENT)
Dept: PHYSICAL THERAPY | Age: 27
End: 2017-06-07
Payer: COMMERCIAL

## 2017-06-14 ENCOUNTER — HOSPITAL ENCOUNTER (OUTPATIENT)
Dept: PHYSICAL THERAPY | Age: 27
Discharge: HOME | End: 2017-06-14
Payer: COMMERCIAL

## 2017-06-14 PROCEDURE — 97140 MANUAL THERAPY 1/> REGIONS: CPT

## 2017-06-14 PROCEDURE — 97112 NEUROMUSCULAR REEDUCATION: CPT

## 2017-06-14 PROCEDURE — 97110 THERAPEUTIC EXERCISES: CPT

## 2017-06-14 NOTE — PROGRESS NOTES
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 NO    If yes, update Summary List   Subjective Functional Status/Changes:  []  No changes reported   Running 8 minutes, walking 3. Pt with report of increased progress since last visit with exception of her balance, does not feel like she has made as much progress. Noted she shifts to R when SLS on R.         OBJECTIVE  Modalities Rationale:     decrease edema, decrease inflammation and decrease pain to improve patient's ability to stand   min [] Estim, type/location:                                      []  att     []  unatt     []  w/US     []  w/ice    []  w/heat    min []  Mechanical Traction: type/lbs                   []  pro   []  sup   []  int   []  cont    []  before manual    []  after manual    min []  Ultrasound, settings/location:      min []  Iontophoresis w/ dexamethasone, location:                                               []  take home patch       []  in clinic   10 min [x]  Ice     []  Heat    location/position: R foot in supine with LE elevation.     min []  Vasopneumatic Device, press/temp:     min []  Other:    [x] Skin assessment post-treatment (if applicable):    [x]  intact    [x]  redness- no adverse reaction     []redness  adverse reaction:        20 min Manual Therapy: Technique:      [x] S/DTM []IASTM [x]PROM [] Passive Stretching   []manual TPR    [x]Jt manipulation:Gr I [x] II [x]  III [x] IV[x] V[]  Treatment Area:  GTE mobility, DF mobility for DF, Ankle PROM, STM to peroneal, gastroc/soleus, arch IASTM to same. Rationale:      decrease pain, increase ROM, increase tissue extensibility and decrease trigger points to improve patient's ability to perform single heel raise for reaching    30 min Therapeutic Exercise:  [x]  See flow sheet   Rationale:      increase ROM and increase strength to improve the patients ability to stand for work    10 min Neuromuscular Re-education:  [x]  See flow sheet   Rationale:      increase ROM and increase strength to improve the patients ability to balance for recreational activity, running. T/o tx min Patient Education:  YES  Reviewed HEP   []  Progressed/Changed HEP based on: Other Objective/Functional Measures:  Pt HEP progression. with clams, single leg stance on foam.  Bridges with progression to Unilateral.    SLS with EC 8 seconds. Post Treatment Pain Level (on 0 to 10) scale:   0  / 10     ASSESSMENT  Assessment/Changes in Function:   Noted mild hip drop with R LE stance, ed on use of support during SLS for reduction in compensation. Advanced HEP to include clams, ER, IR with RTB and IR/ER B hip stretching. []  See Progress Note/Recertification   Patient will continue to benefit from skilled PT services to modify and progress therapeutic interventions, address functional mobility deficits, address ROM deficits, address strength deficits, analyze and address soft tissue restrictions, analyze and cue movement patterns and analyze and modify body mechanics/ergonomics to attain remaining goals. Progress toward goals / Updated goals:    I with progression to gluteal stability of clams, bridges S/L.      PLAN  [x]  Upgrade activities as tolerated YES Continue plan of care   []  Discharge due to :    [x]  Other: Progress gluteal strengthening, ankle ROM x 1 visit, plan for D/C if all LTGs met.      Therapist: Jackie Garcia PT    Date: 6/14/2017 Time: 18 am     Future Appointments  Date Time Provider Lita Nguyen   6/21/2017 12:30 PM Jackie Garcia PT [facility]

## 2017-06-30 NOTE — PROGRESS NOTES
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 Manual Augmented soft tissue massage and trigger point release to B LE to improve ROM for function. Right ankle PROM all planes, STM and joint mobility, neuromuscular re-ed for proprioception. CURRENT STATUS  Patient is progressing well toward all set goals. Good response to orthotic intervention. She has returned to running and demonstrating good progress with walk to run program.     Previous Goals:  1. The patient will have decreased pain to a 1-2/10 maximum to be able to walk for return to work  2. The patient will have WFL ROM left foot for ability to demonstrate heel strike and equal step length for a normal gait pattern. 3. The patient to have Increased strength to > 4+/5 for community ambulation. 4. The patient will have an increased score on FOTO by 24  points for increased ADL tolerance with decreased pain.      Prior Level/Current Level:  1) Prior Level:  4   Current Level: 1-2/10   Goal Met? yes  2) Prior Level: -2 degree DF   Current Level: 12 degrees DF   Goal Met? yes  3) Prior Level: 3+/5   Current Level: 4+/5   Goal Met? yes  4) Prior Level: 30   Current Level: n/a   Goal Met? n/a  RECOMMENDATIONS  Discharge physical therapy at this time, making excellent progress or achieved all LTGs at this time. Will follow up with MD PRN. If you have any questions/comments please contact us directly at [phone]. Thank you for allowing us to assist in the care of your patient.     Therapist Signature: Raheem Rondon, PT Date: 6/30/2017   Reporting Period:   3/13/17-6/14/17 Time: 1230pm

## 2018-06-20 ENCOUNTER — HOSPITAL ENCOUNTER (OUTPATIENT)
Dept: PHYSICAL THERAPY | Age: 28
Discharge: HOME | End: 2018-06-20
Payer: COMMERCIAL

## 2018-06-20 PROCEDURE — 97161 PT EVAL LOW COMPLEX 20 MIN: CPT

## 2018-06-20 PROCEDURE — 97110 THERAPEUTIC EXERCISES: CPT

## 2018-06-20 NOTE — PROGRESS NOTES
[address]  [facility] AT [place]  [address] [facility], [address] [facility], [address] [place] [address] Ln - Phone: [phone]  Fax: [phone] / [address] [facility]  Patient Name: Kim Rhodes : 1990   Medical   Diagnosis: Iliotibial band syndrome, right leg [M76.31]  Pain in right knee [M25.561]  Pain in right ankle and joints of right foot [M25.571] Treatment Diagnosis: R lateral knee pain and R ankle pain   Onset Date:      Referral Source: Daryl Walker MD Start of Care [facility]): 2018   Prior Hospitalization: See medical history Provider #: [id number]   Prior Level of Function: Chronic R foot pain    Comorbidities: Asthma   Medications: Verified on Patient Summary List   The Plan of Care and following information is based on the information from the initial evaluation.   ==============================================================================  Assessment / key information:   Kim Rhodes is a 33 y.o. female with a chief c/o intermittent R lateral knee pain and medial ankle pain, up to 9/10. Pt reports surgery, in , to remove extra foot bone and cyst.  She has returned to previous level of activity, but now has medial R ankle pain in TC joint and lateral knee pain, increased with running and stairs. Pain though, is inconsistent. Meniscus tests were negative, as were valgus stress tests. She reports tenderness at R ITB insertion at the knee. She does have an increased valgus at her R knee joint. Her R glute medius is slightly weaker (4+/5), and she has a B forefoot varus (about 10 degrees) and a slight navicular drop, all of which could increase valgus stress on her R knee. SFMA found dysfunctional multi-segmental rotation, and eyes closed SLS. She lacks full B hip ER AROM (34/35 degrees L/R).   She also lacks B ankle DF AROM/PROM, particularly with her knees straight, with  and 9/17 degrees R/L, indicating decreased gastroc length. Eyes closed SLS was < 5 sec L and R. She lacks the ability to translate her knee past her toes in 1/2 kneel on the R, with < 2\" of ant translation past the great toe (> 4\" L). The patient would benefit from skilled physical therapy at this time.  ===========================================================================================  Eval Complexity: History MEDIUM  Complexity : 1-2 comorbidities / personal factors will impact the outcome/ POC ;  Examination  HIGH Complexity : 4+ Standardized tests and measures addressing body structure, function, activity limitation and / or participation in recreation ; Presentation MEDIUM Complexity : Evolving with changing characteristics ; Decision Making LOW Complexity : FOTO score of ; Overall Complexity LOW   Problem List: pain affecting function, decrease ROM, decrease strength, edema affecting function, impaired gait/ balance, decrease ADL/ functional abilitiies, decrease activity tolerance and decrease flexibility/ joint mobility   Treatment Plan may include any combination of the following: Therapeutic exercise, Therapeutic activities, Neuromuscular re-education, Physical agent/modality, Gait/balance training, Manual therapy, Patient education and Other: Custom Orthotic Fitting  Patient / Family readiness to learn indicated by: asking questions, trying to perform skills and interest  Persons(s) to be included in education: patient (P)  Barriers to Learning/Limitations: None  Measures taken:    Patient Goal (s): \"strengthen leg, avoid surgery, manage pain\"   Patient self reported health status: fair  Rehabilitation Potential: good   Short Term Goals: To be accomplished in  2  weeks:  1. Decrease max R knee and ankle pain to < 5/10  2. Pt compliant with HEP  3.  Obtain custom orthotics for the patient    Long Term Goals: To be accomplished in  4-5  weeks:  1.   All ADL's without R knee or ankle pain  2.  B SLS > 10 sec with eyes closed to indicate increased balance and stability  3. Pt able to run up to 3 miles without any R knee/ankle pain   4. Increase passive B ankle DF with knee straight, to > 20 degrees, to increase function  Frequency / Duration:   Patient to be seen 2-3  times per week for 4-8  weeks:  Patient / Caregiver education and instruction: self care, activity modification and exercises  Therapist Signature: Kay Mauricio PT, MDT Date: 5/40/8934   Certification Period: NA Time: 9:02 AM   ===========================================================================================  I certify that the above Physical Therapy Services are being furnished while the patient is under my care. I agree with the treatment plan and certify that this therapy is necessary. Physician Signature:        Date:       Time:     Please sign and return to [facility] or you may fax the signed copy to [phone]. Thank you.

## 2018-06-20 NOTE — PROGRESS NOTES
PHYSICAL THERAPY - DAILY TREATMENT NOTE    Patient Name: Ness Wilson        Date: 2018  : 1990   YES Patient  Verified  Visit #:     Insurance: Payor: Inez Rabago / Plan: [address] PT / Product Type: Commerical /      In time: 8 Out time: 9   Total Treatment Time: 60     Medicare Time Tracking (below)   Total Timed Codes (min):  - 1:1 Treatment Time:  -     TREATMENT AREA =  R knee/ankle    SUBJECTIVE    Pain Level (on 0 to 10 scale):  0  / 10   Medication Changes/New allergies or changes in medical history, any new surgeries or procedures? NO    If yes, update Summary List   Subjective Functional Status/Changes:  []  No changes reported     See eval          OBJECTIVE    Modality rationale:  -    min [] Estim, type:                                          []  att     []  unatt     []  w/US     []  w/ice    []  w/heat    min []  Mechanical Traction: type/lbs                                               []  pro   []  sup   []  int   []  cont    min []  Ultrasound, settings/location:      min []  Iontophoresis:  []  take home patch w/ dexamethazone    min                                []  in clinic w/ dexamethazone    min []  Ice     []  Heat     position:     min []  Other:      10 min Therapeutic Exercise:  [x]  See flow sheet   []  Other:      []  Added:     to improve (function):    []  Changed:     to improve (function):       min Therapeutic Activity:      5 min Manual Therapy: Manual rolling to R quad/ITB      min Gait Training:       min Patient Education:  YES  Reviewed HEP   []  Progressed/Changed HEP based on:         Other Objective/Functional Measures:    See eval     Post Treatment Pain Level (on 0 to 10) scale:   0  / 10     ASSESSMENT    []  See Progress Note/Recertification   Patient will continue to benefit from skilled therapy to address remaining functional deficits: see eval   Progress toward goals / Updated goals:    -     PLAN    [x]  Upgrade activities as tolerated YES Continue plan of care   []  Discharge due to :    []  Other:      Therapist: Steven Lopez PT    Date: 6/20/2018 Time: 9:00 AM

## 2018-06-22 ENCOUNTER — HOSPITAL ENCOUNTER (OUTPATIENT)
Dept: PHYSICAL THERAPY | Age: 28
Discharge: HOME | End: 2018-06-22
Payer: COMMERCIAL

## 2018-06-22 PROCEDURE — 97140 MANUAL THERAPY 1/> REGIONS: CPT

## 2018-06-22 PROCEDURE — 97110 THERAPEUTIC EXERCISES: CPT

## 2018-06-22 NOTE — PROGRESS NOTES
PHYSICAL THERAPY - DAILY TREATMENT NOTE    Patient Name: Johnnie Sanford        Date: 2018  : 1990    Patient  Verified: YES  Visit #:   2   of   12  Insurance: Payor: John Reyez / Plan: [address] PT / Product Type: Commerical /      In time: 8:55 Out time: 10   Total Treatment Time: 65     Medicare Time Tracking (below)   Total Timed Codes (min):  - 1:1 Treatment Time:  -     TREATMENT AREA/ DIAGNOSIS = Iliotibial band syndrome, right leg [M76.31]  Pain in right knee [M25.561]  Pain in right ankle and joints of right foot [M25.571]    SUBJECTIVE  Pain Level (on 0 to 10 scale):  0  / 10   Medication Changes/New allergies or changes in medical history, any new surgeries or procedures?     NO    If yes, update Summary List   Subjective Functional Status/Changes:  []  No changes reported     Functional improvement no pain   Functional limitation       OBJECTIVE  Modalities Rationale:     decrease edema, decrease inflammation and decrease pain to improve patient's ability to perform ADLs without pain   min [] Estim, type/location:                                      []  att     []  unatt     []  w/US     []  w/ice    []  w/heat    min []  Mechanical Traction: type/lbs                   []  pro   []  sup   []  int   []  cont    []  before manual    []  after manual    min []  Ultrasound, settings/location:      min []  Iontophoresis w/ dexamethasone, location:                                               []  take home patch       []  in clinic   10 min [x]  Ice     []  Heat    location/position: R ankle and R knee    min []  Vasopneumatic Device, press/temp:     min []  Other:    [x] Skin assessment post-treatment (if applicable):    [x]  intact    [x]  redness- no adverse reaction     []redness  adverse reaction:        5 min Manual Therapy: Passive rolling to R ITB and glutes   Rationale:      decrease pain, increase ROM and increase tissue extensibility to improve patient's ability to perform ADLs without pain    50 min Therapeutic Exercise:  [x]  See flow sheet   Rationale:      increase ROM, increase strength and improve balance to improve the patients ability to perform ADLs without pain          min Patient Education:  Yes    [x] Reviewed HEP   []  Progressed/Changed HEP based on: Other Objective/Functional Measures:  Initiated LE strengthening program, with glute prep, WGS, PREs and balance acitivites   Post Treatment Pain Level (on 0 to 10) scale:   1  / 10     ASSESSMENT  Assessment/Changes in Function:     Pt with mild R knee pain at end of second set of squats       []  See Progress Note/Recertification   Patient will continue to benefit from skilled PT services to modify and progress therapeutic interventions, address functional mobility deficits, address ROM deficits, address strength deficits, analyze and address soft tissue restrictions, analyze and cue movement patterns, analyze and modify body mechanics/ergonomics and assess and modify postural abnormalities to attain remaining goals.    Progress toward goals / Updated goals:         PLAN  [x]  Upgrade activities as tolerated YES Continue plan of care   []  Discharge due to :    []  Other:      Therapist: Ferdinand France PT    Date: 6/22/2018 Time: 10:24 AM        Future Appointments  Date Time Provider Lita Nguyen   6/25/2018 8:00 AM Maciej Antony V, PTA [facility] AT [facility]   6/27/2018 8:30 AM Arlette Chin PT [facility]   7/3/2018 8:00 AM Ferdinand France PT [facility]   7/5/2018 8:00 AM Ferdinand France PT [facility]   7/10/2018 8:30 AM Ferdinand France PT [facility]   7/12/2018 8:30 AM Ferdinand France PT [facility] AT [facility]

## 2018-06-25 ENCOUNTER — HOSPITAL ENCOUNTER (OUTPATIENT)
Dept: PHYSICAL THERAPY | Age: 28
Discharge: HOME | End: 2018-06-25
Payer: COMMERCIAL

## 2018-06-25 PROCEDURE — 97140 MANUAL THERAPY 1/> REGIONS: CPT

## 2018-06-25 PROCEDURE — 97110 THERAPEUTIC EXERCISES: CPT

## 2018-06-25 NOTE — PROGRESS NOTES
PHYSICAL THERAPY - DAILY TREATMENT NOTE    Patient Name: Andreea Rankin        Date: 2018  : 1990    Patient  Verified: YES  Visit #:   3   of   12  Insurance: Payor: Severiano Clark / Plan: [address] PT / Product Type: Commerical /      In time: 7:55 Out time: 8:50   Total Treatment Time: 65     Medicare Time Tracking (below)   Total Timed Codes (min):  - 1:1 Treatment Time:  -     TREATMENT AREA/ DIAGNOSIS = Iliotibial band syndrome, right leg [M76.31]  Pain in right knee [M25.561]  Pain in right ankle and joints of right foot [M25.571]    SUBJECTIVE  Pain Level (on 0 to 10 scale):  0  / 10   Medication Changes/New allergies or changes in medical history, any new surgeries or procedures? NO    If yes, update Summary List   Subjective Functional Status/Changes:  []  No changes reported     \"Sore after last visit. \"       OBJECTIVE  Modalities Rationale:     decrease edema, decrease inflammation and decrease pain to improve patient's ability to perform ADLs without pain   min [] Estim, type/location:                                      []  att     []  unatt     []  w/US     []  w/ice    []  w/heat    min []  Mechanical Traction: type/lbs                   []  pro   []  sup   []  int   []  cont    []  before manual    []  after manual    min []  Ultrasound, settings/location:      min []  Iontophoresis w/ dexamethasone, location:                                               []  take home patch       []  in clinic   PD min [x]  Ice     []  Heat    location/position: R ankle and R knee    min []  Vasopneumatic Device, press/temp:     min []  Other:    [x] Skin assessment post-treatment (if applicable):    [x]  intact    [x]  redness- no adverse reaction     []redness  adverse reaction:        10 min Manual Therapy: Passive rolling to R ITB and glutes   Rationale:      decrease pain, increase ROM and increase tissue extensibility to improve patient's ability to perform ADLs without pain    55 min Therapeutic Exercise:  [x]  See flow sheet   Rationale:      increase ROM, increase strength and improve balance to improve the patients ability to perform ADLs without pain     X min Patient Education:  Yes    [x] Reviewed HEP   []  Progressed/Changed HEP based on:   Provided bands to progress clams at home. Other Objective/Functional Measures: Added YTB to clams. Post Treatment Pain Level (on 0 to 10) scale:   0-1/ 10 (sore)     ASSESSMENT  Assessment/Changes in Function:     No sx exacerbation with TE.      []  See Progress Note/Recertification   Patient will continue to benefit from skilled PT services to modify and progress therapeutic interventions, address functional mobility deficits, address ROM deficits, address strength deficits, analyze and address soft tissue restrictions, analyze and cue movement patterns, analyze and modify body mechanics/ergonomics and assess and modify postural abnormalities to attain remaining goals.    Progress toward goals / Updated goals:    Began HEP     PLAN  [x]  Upgrade activities as tolerated YES Continue plan of care   []  Discharge due to :    []  Other:      Therapist: Charity Rader PTA    Date: 6/25/2018 Time: 10:24 AM        Future Appointments  Date Time Provider Lita Nguyen   6/25/2018 8:00 AM Cameron MATOS PTA [facility]   6/27/2018 8:30 AM Reji Resendiz PT [facility]   7/3/2018 8:00 AM Russ Rosas PT [facility]   7/5/2018 8:00 AM Russ Rosas PT [facility]   7/10/2018 8:30 AM Russ Rosas PT [facility]   7/12/2018 8:30 AM Russ Rosas, PT [facility] AT [facility]

## 2018-06-27 ENCOUNTER — HOSPITAL ENCOUNTER (OUTPATIENT)
Dept: PHYSICAL THERAPY | Age: 28
Discharge: HOME | End: 2018-06-27
Payer: COMMERCIAL

## 2018-06-27 PROCEDURE — 97140 MANUAL THERAPY 1/> REGIONS: CPT

## 2018-06-27 PROCEDURE — 97110 THERAPEUTIC EXERCISES: CPT

## 2018-06-27 NOTE — PROGRESS NOTES
PHYSICAL THERAPY - DAILY TREATMENT NOTE    Patient Name: Chelly Madrid        Date: 2018  : 1990   YES Patient  Verified  Visit #:   4   of   12  Insurance: Payor: Hope Lopez / Plan: [address] / Product Type: Commerical /      In time: 830 Out time: 930   Total Treatment Time: 60     Medicare Time Tracking (below)   Total Timed Codes (min):   1:1 Treatment Time:       TREATMENT AREA =  Iliotibial band syndrome, right leg [M76.31]  Pain in right knee [M25.561]  Pain in right ankle and joints of right foot [M25.571]    SUBJECTIVE  Pain Level (on 0 to 10 scale):  0-3(ankle)  / 10   Medication Changes/New allergies or changes in medical history, any new surgeries or procedures? NO    If yes, update Summary List   Subjective Functional Status/Changes:  []  No changes reported     My knee  Doesn't hurt but my foot started            OBJECTIVE  Modalities Rationale:     PD   min [] Estim, type/location:                                      []  att     []  unatt     []  w/US     []  w/ice    []  w/heat    min []  Mechanical Traction: type/lbs                   []  pro   []  sup   []  int   []  cont    []  before manual    []  after manual    min []  Ultrasound, settings/location:      min []  Iontophoresis w/ dexamethasone, location:                                               []  take home patch       []  in clinic    min []  Ice     []  Heat    location/position:     min []  Vasopneumatic Device, press/temp:     min []  Other:    [] Skin assessment post-treatment (if applicable):    []  intact    []  redness- no adverse reaction     []redness  adverse reaction:        50 min Therapeutic Exercise:  [x]  See flow sheet   Rationale:      increase ROM, increase strength and improve coordination to improve the patients ability to perform ADL's and work related activities without increased pain     10 min Manual Therapy: DTM to R ITB followed by rolling to R ITB.    Rationale:      decrease pain, increase ROM and increase tissue extensibility to improve patient's ability to tolerate ADL's and walking without increased pain       min Patient Education:  YES  Reviewed HEP   []  Progressed/Changed HEP based on: Other Objective/Functional Measures:         Post Treatment Pain Level (on 0 to 10) scale:   0  / 10     ASSESSMENT  Assessment/Changes in Function:     Fair tolerance to MT. Continues to have R ITBand tightness. Continues to have decreased hip strength affecting mechanics and knee and ankle     []  See Progress Note/Recertification   Patient will continue to benefit from skilled PT services to modify and progress therapeutic interventions, address functional mobility deficits, address ROM deficits, address strength deficits, analyze and address soft tissue restrictions, analyze and cue movement patterns, analyze and modify body mechanics/ergonomics and assess and modify postural abnormalities to attain remaining goals. Progress toward goals / Updated goals:     All STG's met     PLAN  [x]  Upgrade activities as tolerated YES Continue plan of care   []  Discharge due to :    []  Other:      Therapist: Candice Ann PT    Date: 6/27/2018 Time: 8:24 AM     Future Appointments  Date Time Provider Lita Nguyen   6/27/2018 8:30 AM Candice Ann PT [facility] AT [facility]   7/3/2018 8:00 AM Tanna Markham PT [facility] AT [facility]   7/5/2018 8:00 AM Tanna Markham PT [facility] AT [facility]   7/10/2018 8:30 AM Tanna Markham PT [facility] AT [facility]   7/12/2018 8:30 AM Tanna Markham PT RE[facility] AT [facility]

## 2018-07-03 ENCOUNTER — HOSPITAL ENCOUNTER (OUTPATIENT)
Dept: PHYSICAL THERAPY | Age: 28
Discharge: HOME | End: 2018-07-03
Payer: COMMERCIAL

## 2018-07-03 PROCEDURE — 97110 THERAPEUTIC EXERCISES: CPT

## 2018-07-03 PROCEDURE — 97140 MANUAL THERAPY 1/> REGIONS: CPT

## 2018-07-03 NOTE — PROGRESS NOTES
PHYSICAL THERAPY - DAILY TREATMENT NOTE    Patient Name: Pacheco Lerma        Date: 7/3/2018  : 1990    Patient  Verified: YES  Visit #:      of   12  Insurance: Payor: Laurel Sandra / Plan: [address] / Product Type: Commerical /      In time: 8 Out time: 9:10   Total Treatment Time: 70     Medicare Time Tracking (below)   Total Timed Codes (min):  - 1:1 Treatment Time:  -     TREATMENT AREA/ DIAGNOSIS = Iliotibial band syndrome, right leg [M76.31]  Pain in right knee [M25.561]  Pain in right ankle and joints of right foot [M25.571]    SUBJECTIVE  Pain Level (on 0 to 10 scale):  2  / 10   Medication Changes/New allergies or changes in medical history, any new surgeries or procedures?     NO    If yes, update Summary List   Subjective Functional Status/Changes:  []  No changes reported     Functional improvement  Less knee pain  Functional limitation more foot pain with work      OBJECTIVE  Modalities Rationale:     decrease edema, decrease inflammation and decrease pain to improve patient's ability to perform ADLs without pain   min [] Estim, type/location:                                      []  att     []  unatt     []  w/US     []  w/ice    []  w/heat    min []  Mechanical Traction: type/lbs                   []  pro   []  sup   []  int   []  cont    []  before manual    []  after manual    min []  Ultrasound, settings/location:      min []  Iontophoresis w/ dexamethasone, location:                                               []  take home patch       []  in clinic   10 min [x]  Ice     []  Heat    location/position: R ankle, R knee    min []  Vasopneumatic Device, press/temp:     min []  Other:    [x] Skin assessment post-treatment (if applicable):    [x]  intact    [x]  redness- no adverse reaction     []redness  adverse reaction:        10 min Manual Therapy: Manual rolling of R ITB/lateral leg, GD IV AP mobs to TC joint   Rationale:      decrease pain, increase ROM and increase tissue extensibility to improve patient's ability to perform ADLs without pain    50 min Therapeutic Exercise:  [x]  See flow sheet   Rationale:      increase ROM, increase strength, improve balance and increase proprioception to improve the patients ability to perform ADLs without pain          min Patient Education:  Yes    [x] Reviewed HEP   []  Progressed/Changed HEP based on: Other Objective/Functional Measures:    Pt with less lateral knee pain, but increased R ankle pain  R ankle pain not increased with therapy, tender in medial/ant TC joint line  No pain with strees to medial ankle ligaments  Progressed PREs   Post Treatment Pain Level (on 0 to 10) scale:   1  / 10     ASSESSMENT  Assessment/Changes in Function:     Pt progressing well with less knee pain  R ankle with increased pain, but not provoked with PT  Progressed to SLS on foam   []  See Progress Note/Recertification   Patient will continue to benefit from skilled PT services to modify and progress therapeutic interventions, address functional mobility deficits, address ROM deficits, address strength deficits, analyze and address soft tissue restrictions, analyze and cue movement patterns, analyze and modify body mechanics/ergonomics, assess and modify postural abnormalities and address imbalance/dizziness to attain remaining goals.    Progress toward goals / Updated goals:    Less R knee pain ,improved balance     PLAN  [x]  Upgrade activities as tolerated YES Continue plan of care   []  Discharge due to :    []  Other:      Therapist: Gail Noland PT    Date: 7/3/2018 Time: 10:10 AM        Future Appointments  Date Time Provider Lita Nguyen   7/5/2018 8:00 AM Gail Noland PT [facility]   7/10/2018 8:30 AM Gail Noland PT [facility]   7/12/2018 8:30 AM Gail Noland PT [facility]

## 2018-07-05 ENCOUNTER — APPOINTMENT (OUTPATIENT)
Dept: PHYSICAL THERAPY | Age: 28
End: 2018-07-05
Payer: COMMERCIAL

## 2018-07-10 ENCOUNTER — HOSPITAL ENCOUNTER (OUTPATIENT)
Dept: PHYSICAL THERAPY | Age: 28
Discharge: HOME | End: 2018-07-10
Payer: COMMERCIAL

## 2018-07-10 PROCEDURE — 97140 MANUAL THERAPY 1/> REGIONS: CPT

## 2018-07-10 PROCEDURE — 97110 THERAPEUTIC EXERCISES: CPT

## 2018-07-10 NOTE — PROGRESS NOTES
PHYSICAL THERAPY - DAILY TREATMENT NOTE    Patient Name: Kayla Maier        Date: 7/10/2018  : 1990    Patient  Verified: YES  Visit #:   6   of   12  Insurance: Payor: Felicita Bundy / Plan: [address] / Product Type: Commerical /      In time: 8:30 Out time: 9:30   Total Treatment Time: 60     Medicare Time Tracking (below)   Total Timed Codes (min):  50 1:1 Treatment Time:  50     TREATMENT AREA/ DIAGNOSIS = Iliotibial band syndrome, right leg [M76.31]  Pain in right knee [M25.561]  Pain in right ankle and joints of right foot [M25.571]    SUBJECTIVE  Pain Level (on 0 to 10 scale):  0  / 10   Medication Changes/New allergies or changes in medical history, any new surgeries or procedures?     NO    If yes, update Summary List   Subjective Functional Status/Changes:  []  No changes reported     Functional improvement no ankle or knee pain after last PT session   Functional limitation R ankle pain with work      OBJECTIVE  Modalities Rationale:     decrease edema, decrease inflammation and decrease pain to improve patient's ability to perform ADLs without pain   min [] Estim, type/location:                                      []  att     []  unatt     []  w/US     []  w/ice    []  w/heat    min []  Mechanical Traction: type/lbs                   []  pro   []  sup   []  int   []  cont    []  before manual    []  after manual    min []  Ultrasound, settings/location:      min []  Iontophoresis w/ dexamethasone, location:                                               []  take home patch       []  in clinic   10 min [x]  Ice     []  Heat    location/position: R     min []  Vasopneumatic Device, press/temp:     min []  Other:    [x] Skin assessment post-treatment (if applicable):    [x]  intact    [x]  redness- no adverse reaction     []redness  adverse reaction:        5 min Manual Therapy: Manual rolling to R ITB/quad, GD IV AP mobs to T/C joint    Rationale:      decrease pain, increase ROM and increase tissue extensibility to improve patient's ability to perform ADLs without pain    45 min Therapeutic Exercise:  [x]  See flow sheet   Rationale:      increase ROM, increase strength, improve balance and increase proprioception to improve the patients ability to perform ADLs without pain          min Patient Education:  Yes    [x] Reviewed HEP   []  Progressed/Changed HEP based on: Other Objective/Functional Measures:    Pt with no knee or ankle pain after last session  Pain with working on hard floors   Post Treatment Pain Level (on 0 to 10) scale:   0  / 10     ASSESSMENT  Assessment/Changes in Function:     Pt progressing well   Difficulty with dynamic SLS       []  See Progress Note/Recertification   Patient will continue to benefit from skilled PT services to modify and progress therapeutic interventions, address functional mobility deficits, address ROM deficits, address strength deficits, analyze and address soft tissue restrictions, analyze and cue movement patterns and analyze and modify body mechanics/ergonomics to attain remaining goals.    Progress toward goals / Updated goals:    Progressed to Eyes closed SLS     PLAN  [x]  Upgrade activities as tolerated YES Continue plan of care   []  Discharge due to :    []  Other:      Therapist: Joel Martell PT    Date: 7/10/2018 Time: 9:35 AM        Future Appointments  Date Time Provider Lita Nguyen   7/12/2018 8:30 AM Joel Martell PT [facility] AT [facility]

## 2018-07-12 ENCOUNTER — HOSPITAL ENCOUNTER (OUTPATIENT)
Dept: PHYSICAL THERAPY | Age: 28
Discharge: HOME | End: 2018-07-12
Payer: COMMERCIAL

## 2018-07-12 PROCEDURE — 97110 THERAPEUTIC EXERCISES: CPT

## 2018-07-12 PROCEDURE — 97140 MANUAL THERAPY 1/> REGIONS: CPT

## 2018-07-12 NOTE — PROGRESS NOTES
PHYSICAL THERAPY - DAILY TREATMENT NOTE    Patient Name: Марина Esparza        Date: 2018  : 1990    Patient  Verified: YES  Visit #:     Insurance: Payor: Scott Cassidy / Plan: [address] / Product Type: Commerical /      In time: 8:30 Out time: 9:30   Total Treatment Time: 60     Medicare Time Tracking (below)   Total Timed Codes (min):  - 1:1 Treatment Time:  -     TREATMENT AREA/ DIAGNOSIS = Iliotibial band syndrome, right leg [M76.31]  Pain in right knee [M25.561]  Pain in right ankle and joints of right foot [M25.571]    SUBJECTIVE  Pain Level (on 0 to 10 scale):  2  / 10   Medication Changes/New allergies or changes in medical history, any new surgeries or procedures?     NO    If yes, update Summary List   Subjective Functional Status/Changes:  []  No changes reported     Functional improvement no knee pain   Functional limitation I aggravated my ankle pain with a long day at work      OBJECTIVE  Modalities Rationale:     decrease edema, decrease inflammation and decrease pain to improve patient's ability to perform ADLs without pain   min [] Estim, type/location:                                      []  att     []  unatt     []  w/US     []  w/ice    []  w/heat    min []  Mechanical Traction: type/lbs                   []  pro   []  sup   []  int   []  cont    []  before manual    []  after manual    min []  Ultrasound, settings/location:      min []  Iontophoresis w/ dexamethasone, location:                                               []  take home patch       []  in clinic   10 min [x]  Ice     []  Heat    location/position:     min []  Vasopneumatic Device, press/temp:     min []  Other:    [x] Skin assessment post-treatment (if applicable):    [x]  intact    [x]  redness- no adverse reaction     []redness  adverse reaction:        5 min Manual Therapy: Manual rolling to R ITB/lateral quad   Rationale:      decrease pain, increase ROM and increase tissue extensibility to improve patient's ability to perform ADLs without pain    45 min Therapeutic Exercise:  [x]  See flow sheet   Rationale:      increase ROM, increase strength and improve balance to improve the patients ability to perform ADLs without pain          min Patient Education:  Yes    [x] Reviewed HEP   []  Progressed/Changed HEP based on: Other Objective/Functional Measures:    Pt with increased R ankle pain from standing for long hours on hard floor at work  SLS on foam easy, progressed TO SLS on foam with eyes closed   Post Treatment Pain Level (on 0 to 10) scale:   0  / 10     ASSESSMENT  Assessment/Changes in Function:     Pt with no more lateral knee pain     []  See Progress Note/Recertification   Patient will continue to benefit from skilled PT services to modify and progress therapeutic interventions, address functional mobility deficits, address ROM deficits, address strength deficits, analyze and address soft tissue restrictions, analyze and cue movement patterns, analyze and modify body mechanics/ergonomics and assess and modify postural abnormalities to attain remaining goals. Progress toward goals / Updated goals:    Less pain     PLAN  [x]  Upgrade activities as tolerated YES Continue plan of care   []  Discharge due to :    []  Other:      Therapist: Ad Alejo PT    Date: 7/12/2018 Time: 10:54 AM        No future appointments.

## 2018-07-20 ENCOUNTER — HOSPITAL ENCOUNTER (OUTPATIENT)
Dept: PHYSICAL THERAPY | Age: 28
Discharge: HOME | End: 2018-07-20
Payer: COMMERCIAL

## 2018-07-20 PROCEDURE — 97140 MANUAL THERAPY 1/> REGIONS: CPT

## 2018-07-20 PROCEDURE — 97110 THERAPEUTIC EXERCISES: CPT

## 2018-07-20 NOTE — PROGRESS NOTES
[facility] PHYSICAL THERAPY AT [address], [address] [facility], [address] [place] [address]  Phone: [phone]  Fax: [phone]  Request for use of Dry Needling/Intramuscular Manual Therapy  Patient Name: Martha Mclean : 1990   Treatment/Medical Diagnosis: Iliotibial band syndrome, right leg [M76.31]  Pain in right knee [M25.561]  Pain in right ankle and joints of right foot [M25.571]   Referral Source: Mary Holland MD     Date of Initial Visit: 18 Attended Visits: 8 Missed Visits: 1     Based on findings from the physical therapy examination and evaluation, the evaluating therapist believes the patient, Martha Mclean would benefit from including Dry Needling as part of the plan of care. Dry needling is an effective treatment technique utilized in conjunction with other physical therapy interventions to inactivate myofascial trigger points and the pain and dysfunction they cause. It involves the use of a very fine (usually 0.3 mm/30 gauge) solid filament sterile needle (also used for acupuncture) which is inserted into the skin and directly into a myofascial trigger point. Repeated strokes or movements of the needle without completely withdrawing it help to inactive the trigger point, all of which may take approximately 30 - 60 seconds at each site. Benefits include inactivation of trigger points, decreased pain, increased muscle length, improved movement patterns, and restoration of function. Potential risks include the following: post-needling soreness, infection, bruising/bleeding, penetration of a nerve, and pneumothorax. All treating therapist have been thoroughly educated in ways to avoid the adverse reactions. Dry Needling is an advanced procedure that requires additional training including greater than 54 hours of intensive course work.  Most treatment programs will consist of one session of dry needling each week and a possible second treatment to consist of muscle re-education, flexibility, strengthening and other manual techniques to facilitate the benefits from the dry needling therapy. If you agree with this recommendation, please sign the attached prescription form and fax it to us at [phone]. If you have questions or concerns regarding dry needling or any other treatment we may be providing, please contact us at [phone]. Thank you for allowing us to assist in the care of your patient. Therapist Signature: Mk Shane PT Date: 7/20/2018     Time: 9:19 AM   NOTE TO PHYSICIAN:  PLEASE COMPLETE THE ORDERS BELOW AND FAX TO   [facility]: [phone]  If you are unable to process this request in 24 hours please contact our office: [phone]    ? I have read the above request and AGREE to the recommendation of including dry needling as part of the plan of care. ? I have read the above request and DO NOT AGREE to including dry needling as part of the plan of care.    ? I have read the above report and request that my patient continue therapy with the following changes/special instructions: _________________________________________________     Physician Signature:        Date:       Time:

## 2018-07-20 NOTE — PROGRESS NOTES
[address] [facility] PHYSICAL THERAPY AT [address] [facility], [address] [place] [address], [address] [facility], [address] [place] [address]  Phone: [phone]  Fax: [phone]  PROGRESS NOTE  Patient Name: Roxann Lugo : 1990   Treatment/Medical Diagnosis: Iliotibial band syndrome, right leg [M76.31]  Pain in right knee [M25.561]  Pain in right ankle and joints of right foot [M25.571]   Referral Source: Tobin Hall MD     Date of Initial Visit: 18 Attended Visits: 8 Missed Visits: 1     SUMMARY OF TREATMENT   Roxann Lugo has been seen at our clinic 2x/wk for a total of 8 visits. Pt treatment has consisted of therapeutic exercise for hip/ankle A/PROM and strengthening in all planes, pelvic girdle mm strenthening, functional squat education and manual therapy. CURRENT STATUS   Pt has had a good response to physical therapy treatment. Pt reports 75-80% (R knee), and 40% (R ankle) overall improvement since SOC. Walking, squatting, and ascending stairs is no longer difficult/easier to do on R knee. Continued pain/diffiuclty with prolonged walking, and running on R ankle. Pain at best: 0/10, pain at worst: 6/10 (R ankle). B PROM ankle DF: 10 deg. Pt presents with signs and symptoms consistent with R posterior tibialis tendinitis. Pt would benefit from trigger point dry needling to address myofascial restrictions and reduce pain level. Previous Goals:  1. All ADL's without R knee or ankle pain  2.  B SLS > 10 sec with eyes closed to indicate increased balance and stability  3. Pt able to run up to 3 miles without any R knee/ankle pain   4. Increase passive B ankle DF with knee straight, to > 20 degrees, to increase function   Prior Level/Current Level:  1) Prior Level: all R knee/ankle ADL's painful   Current Level: all R ankle ADL's painful   Goal Met? 50%  2) Prior Level: NT   Current Level: B SLS EC 11 seconds   Goal Met? yes  3) Prior Level: not running   Current Level: not running   Goal Met? no  4) Prior Level:  B ankle DF AROM/PROM, particularly with her knees straight, with 11/18 and 9/17 degrees R/L   Current Level:  B PROM ankle DF: 10 deg   Goal Met? no    New Goals to be achieved in __4-6__  weeks:  1. All ADL's without R knee or ankle pain  2. Pt able to run up to 3 miles without any R knee/ankle pain   3. Increase passive B ankle DF with knee straight, to > 20 degrees, to increase function  RECOMMENDATIONS   Continue therapy 1-2x/week for another 4-6 weeks. If you have any questions/comments please contact us directly at [phone]. Thank you for allowing us to assist in the care of your patient. Therapist Signature: Estephania Macias PT Date: 7/20/2018     Time: 8:25 AM   NOTE TO PHYSICIAN:  PLEASE COMPLETE THE ORDERS BELOW AND FAX TO   [facility] Physical Therapy: [phone]. If you are unable to process this request in 24 hours please contact our office: [phone].    ___ I have read the above report and request that my patient continue as recommended.   ___ I have read the above report and request that my patient continue therapy with the following changes/special instructions:_________________________________________________________   ___ I have read the above report and request that my patient be discharged from therapy.

## 2018-07-20 NOTE — PROGRESS NOTES
PHYSICAL THERAPY - DAILY TREATMENT NOTE      Patient Name: Kay Alvarenga        Date: 2018  : 1990   YES Patient  Verified  Visit #:     Insurance: Payor: Ashish Estrada / Plan: [address] [place] [address] [facility] / Product Type: Commerical /      In time: 8:00 Out time: 9:05   Total Treatment Time: 65       TREATMENT AREA = Iliotibial band syndrome, right leg [M76.31]  Pain in right knee [M25.561]  Pain in right ankle and joints of right foot [M25.571]    SUBJECTIVE    Pain Level (on 0 to 10 scale):  0  / 10   Medication Changes/New allergies or changes in medical history, any new surgeries or procedures? NO    If yes, update Summary List   Subjective Functional Status/Changes:  []  No changes reported     See progress report       OBJECTIVE    55 min Therapeutic Exercise:  [x]  See flow sheet   Rationale:      increase ROM, increase strength and improve balance to improve the patients ability to return to run       10 min Manual Therapy:  R post tib STM   Rationale:      decrease pain and increase tissue extensibility to improve patient's ability to perform all LE ADL's     Throughout Tx min Patient Education:  YES  Reviewed HEP   []  Progressed/Changed HEP based on: Other Objective/Functional Measures:    See progress report     Post Treatment Pain Level (on 0 to 10) scale:   0  / 10     ASSESSMENT    Assessment/Changes in Function:     See progress report     []  See Progress Note/Recertification   Patient will continue to benefit from skilled PT services to modify and progress therapeutic interventions, address functional mobility deficits, address ROM deficits, address strength deficits, analyze and address soft tissue restrictions, analyze and cue movement patterns, analyze and modify body mechanics/ergonomics, assess and modify postural abnormalities and address imbalance/dizziness to attain remaining goals.    Progress toward goals / Updated goals:    See progress report PLAN    [x]  Upgrade activities as tolerated YES Continue plan of care   []  Discharge due to :    []  Other:      Therapist: Ravi Sampson PT    Date: 7/20/2018 Time: 8:24 AM   No future appointments.

## 2018-07-27 ENCOUNTER — HOSPITAL ENCOUNTER (OUTPATIENT)
Dept: PHYSICAL THERAPY | Age: 28
End: 2018-07-27
Payer: COMMERCIAL

## 2018-08-03 ENCOUNTER — HOSPITAL ENCOUNTER (OUTPATIENT)
Dept: PHYSICAL THERAPY | Age: 28
Discharge: HOME | End: 2018-08-03
Payer: COMMERCIAL

## 2018-08-03 PROCEDURE — 97140 MANUAL THERAPY 1/> REGIONS: CPT

## 2018-08-03 PROCEDURE — 97110 THERAPEUTIC EXERCISES: CPT

## 2018-08-03 NOTE — PROGRESS NOTES
PHYSICAL THERAPY - DAILY TREATMENT NOTE   Patient Name: Isabel Garcia        Date: 8/3/2018 : 1990   YES Patient  Verified Visit #:     Insurance: Payor: Ashley Contreras / Plan: [facility] [employer]  CAPITATED PT / Product Type: Commerical / In time: 8:00 Out time: 9:10 Total Treatment Time: 60 TREATMENT AREA = Iliotibial band syndrome, right leg [M76.31] Pain in right knee [M25.561] Pain in right ankle and joints of right foot [M25.571] SUBJECTIVE Pain Level (on 0 to 10 scale):  0  / 10 Medication Changes/New allergies or changes in medical history, any new surgeries or procedures? NO    If yes, update Summary List  
Subjective Functional Status/Changes:  []  No changes reported Soreness in R ankle OBJECTIVE 50 min Therapeutic Exercise:  [x]  See flow sheet Rationale:      increase strength and improve balance to improve the patients ability to return to run 10 min Manual Therapy:  R post tib DTM Rationale:      decrease pain and increase tissue extensibility to improve patient's ability to perform all LE ADL's Modalities Rationale:     decrease pain to improve patient's ability to perform all LE ADL's    
 min [] Estim, type/location:    
                                 []  att     []  unatt     []  w/US     []  w/ice    []  w/heat  
 min []  Mechanical Traction: type/lbs   
               []  pro   []  sup   []  int   []  cont    []  before manual    []  after manual  
 min []  Ultrasound, settings/location:    
 min []  Iontophoresis w/ dexamethasone, location:   
                                           []  take home patch       []  in clinic  
10 min [x]  Ice     []  Heat    location/position: R knee and ankle/supine  
 min []  Vasopneumatic Device, press/temp:   
 min []  Other:   
[x] Skin assessment post-treatment (if applicable):   
[x]  intact    [x]  redness- no adverse reaction    
[]redness  adverse reaction: Throughout Tx min Patient Education:  YES  Reviewed HEP []  Progressed/Changed HEP based on: Other Objective/Functional Measures: Tolerated and completed all therex Post Treatment Pain Level (on 0 to 10) scale:   0  / 10 ASSESSMENT Assessment/Changes in Function: No increased pain during therex 
  
[]  See Progress Note/Recertification Patient will continue to benefit from skilled PT services to modify and progress therapeutic interventions, address functional mobility deficits, address ROM deficits, address strength deficits, analyze and address soft tissue restrictions, analyze and cue movement patterns, analyze and modify body mechanics/ergonomics, assess and modify postural abnormalities and address imbalance/dizziness to attain remaining goals. Progress toward goals / Updated goals: 
 
Continue strengthening to meet all goals PLAN [x]  Upgrade activities as tolerated YES Continue plan of care  
[]  Discharge due to :   
[]  Other:   
 
Therapist: Tavo Orozco PT Date: 8/3/2018 Time: 8:16 AM  
Future Appointments Date Time Provider Lita Nguyen 8/8/2018 7:30 AM Colette Washington PT [facility]

## 2018-08-08 ENCOUNTER — HOSPITAL ENCOUNTER (OUTPATIENT)
Dept: PHYSICAL THERAPY | Age: 28
Discharge: HOME | End: 2018-08-08
Payer: COMMERCIAL

## 2018-08-08 PROCEDURE — 97140 MANUAL THERAPY 1/> REGIONS: CPT

## 2018-08-08 PROCEDURE — 97110 THERAPEUTIC EXERCISES: CPT

## 2018-08-08 NOTE — PROGRESS NOTES
PHYSICAL THERAPY - DAILY TREATMENT NOTE    Patient Name: Anson Haley        Date: 2018  : 1990   YES Patient  Verified  Visit #:   10   of   12  Insurance: Payor: Jorge Luis Giiv / Plan: [address] [place] [address] Rd PT / Product Type: Commerical /      In time: 7:25 Out time: 8:10   Total Treatment Time: 45     Medicare Time Tracking (below)   Total Timed Codes (min):  na 1:1 Treatment Time:  na     TREATMENT AREA =  Iliotibial band syndrome, right leg [M76.31]  Pain in right knee [M25.561]  Pain in right ankle and joints of right foot [M25.571]    SUBJECTIVE  Pain Level (on 0 to 10 scale):  0  / 10   Medication Changes/New allergies or changes in medical history, any new surgeries or procedures? NO    If yes, update Summary List   Subjective Functional Status/Changes:  []  No changes reported     No new c/o          OBJECTIVE      35 min Therapeutic Exercise:  [x]  See flow sheet   Rationale:      increase ROM, increase strength and improve coordination to improve the patients ability to perform ADLs     8 min Manual Therapy: Mid foot mob, cuboid whip, shotgun tech, L4-5 L FRS, L on L SI meg   Rationale:      decrease pain, increase ROM and increase tissue extensibility to improve patient's ability to perform ADLs    Dry Needling Procedure Note    Dry Needle Session Number:  1    Procedure: An intramuscular manual therapy (dry needling) and a neuro-muscular re-education treatment was done to deactivate myofascial trigger points, with a 15/30 gauge solid filament needle, under aseptic technique. Indication(s): [x] Muscle spasms [x] Myalgia/Myositis  [] Muscle cramps      [] Muscle imbalances [] TMD (TMJ) [] Myofascial pain & dysfunction     [] Other: __    Chart reviewed for the following:  Blayne DE LA CRUZ, PT, have reviewed the medical history, summary list and precautions/contraindications for Anson Haley.     TIME OUT performed immediately prior to start of procedure:  7:42 (enter time the timeout was completed)  Vita DE LA CRUZ, PT, have performed the following reviews on Darling Jha prior to the start of the session:      [x] Patient was identified by name and date of birth    [x] Agreement on all muscles being treated was verified   [x] Purpose of dry needling, side effects, possible complications, risks and benefits were explained to the patient   [x] Procedure site(s) verified  [x] Patient was positioned for comfort and draped for privacy  [x] Informed Consent was signed (initial visit) and verified verbally (subsequent visits)  [x] Patient was instructed on the need to report the use of blood thinners and/or immunosuppressant medications  [x] How to respond to possible adverse effects of treatment  [x] Self treatment of post needling soreness: ice, heat (moist heat, heat wraps) and stretching  [x] Opportunity was given to ask any questions, all questions were answered            Treatment:  The following muscles were treated today:    Right: TFL, QL   Left:      Patients response to todays treatment:   [x]  LTRs  [x]  Muscle Relaxation  []  Pain Relief  []  Decreased Tinnitus  []  Decreased HAs []  Post needling soreness []  Increased ROM   []  Other:         min Patient Education:  YES  Reviewed HEP   []  Progressed/Changed HEP based on:         Other Objective/Functional Measures:    Limited midfoot modbility on R noted  Lat wt shift with SL Bal  TTP noted at R QL and TFL  Reduced R hip ext and abd strength  Difficulty with 1/2 kneel    Post Treatment Pain Level (on 0 to 10) scale:   0  / 10     ASSESSMENT  Assessment/Changes in Function:     Good chau to all Rx without increase in pain      []  See Progress Note/Recertification   Patient will continue to benefit from skilled PT services to modify and progress therapeutic interventions, address functional mobility deficits, address ROM deficits, address strength deficits, analyze and address soft tissue restrictions, analyze and cue movement patterns, analyze and modify body mechanics/ergonomics and assess and modify postural abnormalities to attain remaining goals.    Progress toward goals / Updated goals:    Slowly progressing with function     PLAN  []  Upgrade activities as tolerated YES Continue plan of care   []  Discharge due to :    []  Other:      Therapist: Naomi Miller, PT, OCS, SCS, CSCS    Date: 8/8/2018 Time: 6:29 AM       Future Appointments  Date Time Provider Lita Ngyuen   8/8/2018 7:30 AM FREDY Teran [facility]

## 2018-08-15 ENCOUNTER — HOSPITAL ENCOUNTER (OUTPATIENT)
Dept: PHYSICAL THERAPY | Age: 28
Discharge: HOME | End: 2018-08-15
Payer: COMMERCIAL

## 2018-08-15 PROCEDURE — 97110 THERAPEUTIC EXERCISES: CPT

## 2018-08-15 PROCEDURE — 97140 MANUAL THERAPY 1/> REGIONS: CPT

## 2018-08-15 NOTE — PROGRESS NOTES
PHYSICAL THERAPY - DAILY TREATMENT NOTE    Patient Name: Evaristo Reyes        Date: 8/15/2018  : 1990   yes Patient  Verified  Visit #:     Insurance: Payor: [facility] / Plan: [address] [place] [address] Scot PT / Product Type: Commerical /      In time: 8:00 Out time: 8:35   Total Treatment Time: 35     Medicare/[facility] Time Tracking (below)   Total Timed Codes (min):  na 1:1 Treatment Time:  na     TREATMENT AREA =  Iliotibial band syndrome, right leg [M76.31]  Pain in right knee [M25.561]  Pain in right ankle and joints of right foot [M25.571]    SUBJECTIVE  Pain Level (on 0 to 10 scale):  5  / 10   Medication Changes/New allergies or changes in medical history, any new surgeries or procedures?    no  If yes, update Summary List   Subjective Functional Status/Changes:  []  No changes reported     Pt reports significant relief of tension following dry needling last visit. C/o inc pain medial R foot/ankle due to being very busy at work yesterday. Reports she received MRI on the R foot last night. OBJECTIVE    23 min Therapeutic Exercise:  [x]  See flow sheet   Rationale:      increase ROM and increase strength to improve the patients ability to stand/ambulate     12 min Manual Therapy: Dn per procedural note; shotgun tech, midfoot mob   Rationale:      decrease pain, increase ROM, increase tissue extensibility and decrease trigger points to improve patient's ability to complete ADLs  Dry Needling Procedure Note    Dry Needle Session Number:  2    Procedure: An intramuscular manual therapy (dry needling) and a neuro-muscular re-education treatment was done to deactivate myofascial trigger points, with a 15/30 gauge solid filament needle, under aseptic technique.     Indication(s): [] Muscle spasms [] Myalgia/Myositis  [] Muscle cramps      [] Muscle imbalances [] TMD (TMJ) [x] Myofascial pain & dysfunction     [] Other: __    Chart reviewed for the following:  Anushka DE LA RCUZ, PT, have reviewed the medical history, summary list and precautions/contraindications for [employer]. TIME OUT performed immediately prior to start of procedure:  2 (enter time the timeout was completed)  IRao, PT, have performed the following reviews on [employer] prior to the start of the session:      [x] Patient was identified by name and date of birth    [x] Agreement on all muscles being treated was verified   [x] Purpose of dry needling, side effects, possible complications, risks and benefits were explained to the patient   [x] Procedure site(s) verified  [x] Patient was positioned for comfort and draped for privacy  [x] Informed Consent was signed (initial visit) and verified verbally (subsequent visits)  [x] Patient was instructed on the need to report the use of blood thinners and/or immunosuppressant medications  [x] How to respond to possible adverse effects of treatment  [x] Self treatment of post needling soreness: ice, heat (moist heat, heat wraps) and stretching  [x] Opportunity was given to ask any questions, all questions were answered            Treatment:  The following muscles were treated today:    Right: TFL, glute med, post tib, FHL   Left:      Patients response to todays treatment:   [x]  LTRs  []  Muscle Relaxation  []  Pain Relief  []  Decreased Tinnitus  []  Decreased HAs [x]  Post needling soreness []  Increased ROM   []  Other:         min Patient Education:  yes  Reviewed HEP   []  Progressed/Changed HEP based on:        Other Objective/Functional Measures:    Significant ttp/referral to medial ankle w/ DN to the medial ankle  Pt challenged to maintain hip ext in supine bridge w/ march  Cueing for neutral spine in quadruped and glute contraction in 1/2 kneel     Post Treatment Pain Level (on 0 to 10) scale:   5  / 10     ASSESSMENT  Assessment/Changes in Function:     Pt reported soreness post tx session but no inc in pain     []  See Progress Note/Recertification   Patient will continue to benefit from skilled PT services to modify and progress therapeutic interventions, address functional mobility deficits, address ROM deficits, address strength deficits, analyze and address soft tissue restrictions, analyze and cue movement patterns, assess and modify postural abnormalities and instruct in home and community integration to attain remaining goals.    Progress toward goals / Updated goals:    No significant progress to LTG #2 due to continued R ankle pain     PLAN  []  Upgrade activities as tolerated yes Continue plan of care   []  Discharge due to :    []  Other:      Therapist: Baron Meka PT    Date: 8/15/2018 Time: 8:04 AM     Future Appointments  Date Time Provider Lita Nguyen   8/24/2018 8:00 AM Baron Meka PT [facility]   8/31/2018 8:00 AM Baron Meka PT [facility]

## 2018-08-24 ENCOUNTER — HOSPITAL ENCOUNTER (OUTPATIENT)
Dept: PHYSICAL THERAPY | Age: 28
End: 2018-08-24
Payer: COMMERCIAL

## 2018-08-31 ENCOUNTER — HOSPITAL ENCOUNTER (OUTPATIENT)
Dept: PHYSICAL THERAPY | Age: 28
Discharge: HOME | End: 2018-08-31
Payer: COMMERCIAL

## 2018-08-31 PROCEDURE — 97110 THERAPEUTIC EXERCISES: CPT

## 2018-08-31 NOTE — PROGRESS NOTES
PHYSICAL THERAPY - DAILY TREATMENT NOTE Patient Name: Larry Martinez        Date: 2018 : 1990   yes Patient  Verified Visit #:   15   of   12  Insurance: Payor: Oliver Mendoza / Plan: [facility]  CAPITATED PT / Product Type: Commerical / In time: 7:59 Out time: 8:32 Total Treatment Time: 35 Medicare/[facility] Time Tracking (below) Total Timed Codes (min):  na 1:1 Treatment Time:  na  
 
TREATMENT AREA =  Iliotibial band syndrome, right leg [M76.31] Pain in right knee [M25.561] Pain in right ankle and joints of right foot [M25.571] SUBJECTIVE Pain Level (on 0 to 10 scale):  0  / 10 Medication Changes/New allergies or changes in medical history, any new surgeries or procedures?    no  If yes, update Summary List  
Subjective Functional Status/Changes:  []  No changes reported Pt reports 85% overall improvement in the R knee, 60% in the R foot/ankle. 2/10 max pain R knee, 7/10 p! Foot/ankle p! (w/ prolonged walking). OBJECTIVE 33 min Therapeutic Exercise:  [x]  See flow sheet Rationale:      increase ROM, increase strength and improve coordination to improve the patients ability to ambulate  
 
 min Patient Education:  yes  Reviewed HEP []  Progressed/Changed HEP based on: Other Objective/Functional Measures: 
 
gastroc flex R 2 deg DF AROM 15 deg Post Treatment Pain Level (on 0 to 10) scale:   0  / 10 ASSESSMENT Assessment/Changes in Function:  
 
See DC summary 
  
[]  See Progress Note/Recertification Patient will continue to benefit from skilled PT services to n/a to attain remaining goals. Progress toward goals / Updated goals: 
 
See DC summary PLAN 
[]  Upgrade activities as tolerated yes Continue plan of care  
[]  Discharge due to :   
[]  Other:   
 
Therapist: Gala Ramsay, PT Date: 2018 Time: 8:03 AM  
 
No future appointments.

## 2018-09-25 NOTE — PROGRESS NOTES
[facility] PHYSICAL THERAPY 
[facility], [address] [place] [address] - Phone: [phone]  Fax: [phone] DISCHARGE SUMMARY Patient Name: Evaristo Reyes : 1990 Treatment/Medical Diagnosis: Iliotibial band syndrome, right leg [M76.31] Pain in right knee [M25.561] Pain in right ankle and joints of right foot [M25.571] Referral Source: Moy Khan MD    
Date of Initial Visit: 18 Attended Visits: 11 Missed Visits: 0  
 
SUMMARY OF TREATMENT Pt attended 11 sessions of PT for the tx of R ITB syndrome/medial ankle pain. PT tx consisted of therex, manual tx, dry needling, modalities prn, and HEP. CURRENT STATUS Pt reported 85% overall improvement in sx in the R knee, 60% in the R foot/ankle. Pt notes 2/10 max pain R knee however continued w/ 7/10 max p! In the foot/ankle w/ prolonged walking and stair negotiation. Gastroc flexibility R 2 deg, DF AROM w/ knee flexed 15 deg. Pt received MRI of the R ankle and was awaiting f/u w/ surgeon for results. Pt felt she was able to self manage current knee sx and had planned to hold on ankle tx until f/u with your office. Pt discharged from our care at this time. Thank you. Goal/Measure of Progress Goal Met? 1. All ADL's without R knee or ankle pain Status at last Eval: All R ankle ADLs painful Current Status: 7/10 max pain w/ prolonged walking no 2. Pt able to run up to 3 miles without any R knee/ankle pain Status at last Eval: Not currently running Current Status: Not currently running 2' pain no 3. Inc passive B ankle DF w/ knee straight to >20 deg to inc function Status at last Eval: 10 deg R Active Current Status: 2 deg no RECOMMENDATIONS Discontinue therapy due to lack of appreciable progress towards goals. If you have any questions/comments please contact us directly at [phone]. Thank you for allowing us to assist in the care of your patient. Therapist Signature: Shania Trujillo, PT Date: 9/25/18   Time: 8:01 AM

## 2018-10-17 ENCOUNTER — APPOINTMENT (OUTPATIENT)
Dept: PHYSICAL THERAPY | Age: 28
End: 2018-10-17

## 2018-10-31 ENCOUNTER — HOSPITAL ENCOUNTER (OUTPATIENT)
Dept: PHYSICAL THERAPY | Age: 28
Discharge: HOME | End: 2018-10-31
Payer: COMMERCIAL

## 2018-10-31 PROCEDURE — 97535 SELF CARE MNGMENT TRAINING: CPT

## 2018-10-31 PROCEDURE — 97162 PT EVAL MOD COMPLEX 30 MIN: CPT

## 2018-10-31 PROCEDURE — 97140 MANUAL THERAPY 1/> REGIONS: CPT

## 2018-10-31 NOTE — PROGRESS NOTES
PHYSICAL THERAPY - DAILY TREATMENT NOTE Patient Name: Hector Medel        Date: 10/31/2018 : 1990   YES Patient  Verified Visit #:      12  Insurance: Payor: Garry Mccarthy / Plan: [facility]  CAPITATED PT / Product Type: Commerical / In time: 7:30 Out time: 8:10 Total Treatment Time: 40 Medicare Time Tracking (below) Total Timed Codes (min):  na 1:1 Treatment Time:  na  
TREATMENT AREA =  Weakness [R53.1] SUBJECTIVE Pain Level (on 0 to 10 scale):   
Medication Changes/New allergies or changes in medical history, any new surgeries or procedures? NO    If yes, update Summary List  
Subjective Functional Status/Changes:  []  No changes reported SEE IE  
 
  
 
OBJECTIVE 10 min Manual Therapy: Midfoot mob, TN jt mob, L4 L FRS meg Rationale:      decrease pain, increase ROM and increase tissue extensibility to improve patient's ability to amb 
 min Patient Education:  YES  Reviewed HEP []  Progressed/Changed HEP based on: Other Objective/Functional Measures: SEE IE 
  
Post Treatment Pain Level (on 0 to 10) scale:    
ASSESSMENT Assessment/Changes in Function: SEE IE  
[]  See Progress Note/Recertification Patient will continue to benefit from skilled PT services to modify and progress therapeutic interventions, address functional mobility deficits, address ROM deficits, address strength deficits, analyze and address soft tissue restrictions, analyze and cue movement patterns, analyze and modify body mechanics/ergonomics and assess and modify postural abnormalities to attain remaining goals. Progress toward goals / Updated goals: PLAN 
[]  Upgrade activities as tolerated YES Continue plan of care  
[]  Discharge due to :   
[]  Other:   
 
Therapist: Charles Finney, PT, OCS, SCS, CSCS Date: 10/31/2018 Time: 8:02 AM  
 
 
Future Appointments Date Time Provider Lita Nguyen 2018  7:30 AM Asim Hartley, PT [facility] 11/7/2018  7:00 AM Archana Ulysses, PT [facility]  
11/14/2018  7:00 AM Archana Ulysses, PT [facility]  
11/21/2018  6:30 AM Archanamandeep Mead, PT [facility]  
11/23/2018  7:30 AM Archanamandeep Mead, PT [facility]  
11/28/2018  6:30 AM Archanamandeep Mead, PT [facility]

## 2018-10-31 NOTE — PROGRESS NOTES
[address] PHYSICAL THERAPY 
[address] [place] [address] Drive, [address] [facility] - Phone: [phone]  Fax: [phone] PLAN OF CARE / STATEMENT OF MEDICAL NECESSITY FOR PHYSICAL THERAPY SERVICES Patient Name: Hector Medel : 1990 Medical  
Diagnosis: Chronic pain of right ankle [M25.571, G89.29] Treatment Diagnosis: Chronic pain of right ankle [M25.571, G89.29] Onset Date: May, 2018 Referral Source: Nelly Swartz MD Start of [facility]): 10/31/2018 Prior Hospitalization: See medical history Provider #: [id number] Prior Level of Function: Difficulty with prolonged amb Comorbidities: Asthma Medications: Verified on Patient Summary List  
The Plan of Care and following information is based on the information from the initial evaluation.  
=========================================================================================== Assessment / key information:  Hector Medel is a 33 y.o.  yo female with Dx of Chronic pain of right ankle [M25.571, G89.29]. She reports chronic Hx of R ankle pain. Her past Rx includes surgery in 2017 and PT. She currently rates her pain as 8/10 at worst, 2/10 at best, primarily located at medial aspect of her R ankle. She complains of difficulty and increase pain with prolonged walking/running. Objective Findings:  Ankle ROM: DF: R = 12, L = 15, PF: R = 46, L = 48, IV: R = 25, L = 20, EV: R = 15, L = 18. Gait: increased pronation and R LE IR noted during stance phase. Manual Muscle Testing:   R ankle IV and hip abd are Reduced. Palpation:  Decreased talonavicular jt mobility noted. Lumbar/SI Screening: R trunk Rot limited by 30%, L4 L FRS noted. Pt instructed in HEP and will f/u in clinic for PT.=========================================================================================== Eval Complexity: History MEDIUM  Complexity : 1-2 comorbidities / personal factors will impact the outcome/ POC ;  Examination  MEDIUM Complexity : 3 Standardized tests and measures addressing body structure, function, activity limitation and / or participation in recreation ; Presentation MEDIUM Complexity : Evolving with changing characteristics ; Decision Making MEDIUM Complexity : FOTO score of 26-74; Overall Complexity MEDIUM Problem List: pain affecting function, decrease ROM, decrease strength, edema affecting function, impaired gait/ balance, decrease ADL/ functional abilitiies, decrease activity tolerance and decrease flexibility/ joint mobility Treatment Plan may include any combination of the following: Therapeutic exercise, Therapeutic activities, Neuromuscular re-education, Physical agent/modality, Gait/balance training, Manual therapy, Patient education, Self Care training and Functional mobility training Patient / Family readiness to learn indicated by: asking questions, trying to perform skills and interestPersons(s) to be included in education: patient (P) Barriers to Learning/Limitations: no 
Measures taken: FOTO = 64% Patient Goal (s): Decrease pain Patient self reported health status: good Rehabilitation Potential: good? Short Term Goals: To be accomplished in  1-2  weeks: 
1. Independent with HEP. 2. Decrease max pain 25-50% to assist with amb ? Long Term Goals: To be accomplished in  3-4  weeks: 1. Decrease max pain 50-75% to assist with amb 2. Increase FOTO score to 76% to show functional improvment. 3.  Will rate  >/= +5 on Global Rating of Change and be prepared to DC to HEP. Frequency / Duration:   Patient to be seen  2-3  times per week for 3-4  weeks: 
Patient / Caregiver education and instruction: self care and exercises Therapist Signature: Charls Dakin, DPT, DAMI, SCS, CSCS Date: 10/31/2018 Certification Period: na Time: 8:13 AM  
=========================================================================================== 
 I certify that the above Physical Therapy Services are being furnished while the patient is under my care. I agree with the treatment plan and certify that this therapy is necessary. Physician Signature:       Date:      Time:  Please sign and return to [facility] [place] or you may fax the signed copy to [phone]. Thank you.

## 2018-11-05 ENCOUNTER — HOSPITAL ENCOUNTER (OUTPATIENT)
Dept: PHYSICAL THERAPY | Age: 28
Discharge: HOME | End: 2018-11-05
Payer: COMMERCIAL

## 2018-11-05 PROCEDURE — 97110 THERAPEUTIC EXERCISES: CPT

## 2018-11-05 PROCEDURE — 97140 MANUAL THERAPY 1/> REGIONS: CPT

## 2018-11-05 NOTE — PROGRESS NOTES
PHYSICAL THERAPY - DAILY TREATMENT NOTE Patient Name: Kavon Melvin        Date: 2018 : 1990   YES Patient  Verified Visit #:      12  Insurance: Payor: Mauricio Hidalgo / Plan: [facility] [employer]  CAPITATED PT / Product Type: Commerical / In time: 7:30 Out time: 8:11 Total Treatment Time: 41 Medicare Time Tracking (below) Total Timed Codes (min):  na 1:1 Treatment Time:  na  
TREATMENT AREA =  Chronic pain of right ankle [M25.571, G89.29] SUBJECTIVE Pain Level (on 0 to 10 scale):  0  / 10 Medication Changes/New allergies or changes in medical history, any new surgeries or procedures? NO    If yes, update Summary List  
Subjective Functional Status/Changes:  []  No changes reported I have been doing well. I really havent had much pain since the last visit. OBJECTIVE 26 min Therapeutic Exercise:  [x]  See flow sheet Rationale:      increase ROM, increase strength and improve coordination to improve the patients ability to amb 15 min Manual Therapy: Midfoot mob, TN jt mob, L4 L FRS meg, DTM para, post tib, Gm  
Rationale:      decrease pain, increase ROM and increase tissue extensibility to improve patient's ability to amb 
 
 
 min Patient Education:  YES  Reviewed HEP []  Progressed/Changed HEP based on: Other Objective/Functional Measures: 
 
Cont to have sig TTP at post tib Post Treatment Pain Level (on 0 to 10) scale:   0  / 10 ASSESSMENT Assessment/Changes in Function:  
 
Good chau to all Rx without increase in pain  
  
 
PLAN 
[]  Upgrade activities as tolerated YES Continue plan of care  
[]  Discharge due to :   
[]  Other:   
 
Therapist: Farida Rosenberg, PT, OCS, SCS, CSCS Date: 11/5/2018 Time: 6:29 AM  
 
 
Future Appointments Date Time Provider Lita Nguyen 11/5/2018  7:30 AM Ngozi Valle PT [facility]  
11/7/2018  7:00 AM Ngozi Valle PT [facility]  
11/14/2018  7:00 AM Ngozi Valle PT [facility]  
11/21/2018  6:30 AM Ngozi Valle PT [facility]  
11/23/2018  7:30 AM Ngozi Valle PT [facility]  
11/28/2018  6:30 AM Ngozi Valle PT [facility]

## 2018-11-07 ENCOUNTER — HOSPITAL ENCOUNTER (OUTPATIENT)
Dept: PHYSICAL THERAPY | Age: 28
Discharge: HOME | End: 2018-11-07
Payer: COMMERCIAL

## 2018-11-07 PROCEDURE — 97110 THERAPEUTIC EXERCISES: CPT

## 2018-11-07 PROCEDURE — 97140 MANUAL THERAPY 1/> REGIONS: CPT

## 2018-11-07 NOTE — PROGRESS NOTES
PHYSICAL THERAPY - DAILY TREATMENT NOTE Patient Name: Dat Bardales        Date: 2018 : 1990   YES Patient  Verified Visit #:   3   of   12  Insurance: Payor: Scarlet Deep / Plan: [facility]  CAPITATED PT / Product Type: Commerical / In time: 7:00 Out time: 7:37 Total Treatment Time: 37 Medicare Time Tracking (below) Total Timed Codes (min):  na 1:1 Treatment Time:  na  
TREATMENT AREA =  Chronic pain of right ankle [M25.571, G89.29] SUBJECTIVE Pain Level (on 0 to 10 scale):  0  / 10 Medication Changes/New allergies or changes in medical history, any new surgeries or procedures? NO    If yes, update Summary List  
Subjective Functional Status/Changes:  []  No changes reported Its been great. No real pain since the last visit. OBJECTIVE 26 min Therapeutic Exercise:  [x]  See flow sheet Rationale:      increase ROM, increase strength and improve coordination to improve the patients ability to amb 11 min Manual Therapy: Midfoot mob, L4 L FRS meg, DTM para, post tib, Gm  
Rationale:      decrease pain, increase ROM and increase tissue extensibility to improve patient's ability to amb 
  
 min Patient Education:  YES  Reviewed HEP []  Progressed/Changed HEP based on: Other Objective/Functional Measures: 
 
Cont to have increased soft tissue tension at R Gm 
  
Post Treatment Pain Level (on 0 to 10) scale:   0  / 10 ASSESSMENT Assessment/Changes in Function:  
 
Good chau to all Rx without increase in pain  
  
[]  See Progress Note/Recertification Patient will continue to benefit from skilled PT services to modify and progress therapeutic interventions, address functional mobility deficits, address ROM deficits, address strength deficits, analyze and address soft tissue restrictions, analyze and cue movement patterns, analyze and modify body mechanics/ergonomics and assess and modify postural abnormalities to attain remaining goals. Progress toward goals / Updated goals: 
Progressing well with pain reduction PLAN 
[]  Upgrade activities as tolerated YES Continue plan of care  
[]  Discharge due to :   
[]  Other:   
 
Therapist: Cari Ferrell, PT, OCS, SCS, CSCS Date: 11/7/2018 Time: 6:29 AM  
 
 
Future Appointments Date Time Provider Lita Nguyen 11/7/2018  7:00 AM Mike Cuenca PT [facility]  
11/14/2018  7:00 AM Mike Cuenca PT [facility]  
11/21/2018  6:30 AM Mike Cuenca PT [facility]  
11/23/2018  7:30 AM Mike Cuenca PT [facility]  
11/28/2018  6:30 AM Mike Cuenca PT [facility]

## 2018-11-14 ENCOUNTER — APPOINTMENT (OUTPATIENT)
Dept: PHYSICAL THERAPY | Age: 28
End: 2018-11-14
Payer: COMMERCIAL

## 2018-11-21 ENCOUNTER — HOSPITAL ENCOUNTER (OUTPATIENT)
Dept: PHYSICAL THERAPY | Age: 28
Discharge: HOME | End: 2018-11-21
Payer: COMMERCIAL

## 2018-11-21 PROCEDURE — 97110 THERAPEUTIC EXERCISES: CPT

## 2018-11-21 PROCEDURE — 97140 MANUAL THERAPY 1/> REGIONS: CPT

## 2018-11-21 NOTE — PROGRESS NOTES
PHYSICAL THERAPY - DAILY TREATMENT NOTE Patient Name: Gregory Philippe        Date: 2018 : 1990   YES Patient  Verified Visit #:     Insurance: Payor: Pau Pisano / Plan: [facility] [employer]  CAPITATED PT / Product Type: Commerical / In time: 6:30 Out time: 7:18 Total Treatment Time: 48 Medicare Time Tracking (below) Total Timed Codes (min):  na 1:1 Treatment Time:  na  
TREATMENT AREA =  Chronic pain of right ankle [M25.571, G89.29] SUBJECTIVE Pain Level (on 0 to 10 scale):  0  / 10 Medication Changes/New allergies or changes in medical history, any new surgeries or procedures? NO    If yes, update Summary List  
Subjective Functional Status/Changes:  []  No changes reported I got a little sore yesterday since I was very busy at work, but its been much better OBJECTIVE 30 min Therapeutic Exercise:  [x]  See flow sheet Rationale:      increase ROM, increase strength and improve coordination to improve the patients ability to amb 
  
15 min Manual Therapy: Midfoot mob, L4 L FRS meg, DTM para, post tib, Gm  
Rationale:      decrease pain, increase ROM and increase tissue extensibility to improve patient's ability to amb 
  
 min Patient Education:  YES  Reviewed HEP []  Progressed/Changed HEP based on: Other Objective/Functional Measures: 
 
Cont to have increased soft tissue tension noted at R para Post Treatment Pain Level (on 0 to 10) scale:   0  / 10 ASSESSMENT Assessment/Changes in Function:  
 
Cont to demonstrate slight instability with 1/2 kneel 
  
[]  See Progress Note/Recertification Patient will continue to benefit from skilled PT services to modify and progress therapeutic interventions, address functional mobility deficits, address ROM deficits, address strength deficits, analyze and address soft tissue restrictions, analyze and cue movement patterns, analyze and modify body mechanics/ergonomics and assess and modify postural abnormalities to attain remaining goals. Progress toward goals / Updated goals: 
Progressing well with pain reduction PLAN 
[]  Upgrade activities as tolerated YES Continue plan of care  
[]  Discharge due to :   
[]  Other:   
 
Therapist: Keiry Plummer, PT, OCS, SCS, CSCS Date: 11/21/2018 Time: 6:29 AM  
 
 
Future Appointments Date Time Provider Lita Nguyen 11/21/2018  6:30 AM Isabella Mayberry PT [facility]  
11/23/2018  7:30 AM Isabella Mayberry PT [facility]  
11/28/2018  6:30 AM Isabella Mayberry PT [facility]

## 2018-11-23 ENCOUNTER — HOSPITAL ENCOUNTER (OUTPATIENT)
Dept: PHYSICAL THERAPY | Age: 28
End: 2018-11-23
Payer: COMMERCIAL

## 2018-11-28 ENCOUNTER — HOSPITAL ENCOUNTER (OUTPATIENT)
Dept: PHYSICAL THERAPY | Age: 28
Discharge: HOME | End: 2018-11-28
Payer: COMMERCIAL

## 2018-11-28 PROCEDURE — 97140 MANUAL THERAPY 1/> REGIONS: CPT

## 2018-11-28 PROCEDURE — 97110 THERAPEUTIC EXERCISES: CPT

## 2018-11-28 NOTE — PROGRESS NOTES
PHYSICAL THERAPY - DAILY TREATMENT NOTE Patient Name: Breanne Marx        Date: 2018 : 1990   YES Patient  Verified Visit #:      12  Insurance: Payor: Ermalinda Phalen / Plan: [facility] [employer]  CAPITATED PT / Product Type: Commerical / In time: 6:35 Out time: 7:15 Total Treatment Time: 40 Medicare Time Tracking (below) Total Timed Codes (min):  na 1:1 Treatment Time:  na  
TREATMENT AREA =  Chronic pain of right ankle [M25.571, G89.29] SUBJECTIVE Pain Level (on 0 to 10 scale):  0  / 10 Medication Changes/New allergies or changes in medical history, any new surgeries or procedures? NO    If yes, update Summary List  
Subjective Functional Status/Changes:  []  No changes reported 3/10 at the worst with standing/walking all day, but its been really good OBJECTIVE 25 min Therapeutic Exercise:  [x]  See flow sheet Rationale:      increase ROM, increase strength and improve coordination to improve the patients ability to amb 
  
15 min Manual Therapy: Midfoot mob, L4 L FRS meg, DTM para, post tib, Gm  
Rationale:      decrease pain, increase ROM and increase tissue extensibility to improve patient's ability to amb 
  
 min Patient Education:  YES  Reviewed HEP []  Progressed/Changed HEP based on: Other Objective/Functional Measures: FOTO = 70 GROC = +5 Post Treatment Pain Level (on 0 to 10) scale:   0  / 10 ASSESSMENT Assessment/Changes in Function:  
 
Good chau to all Rx without increase in pain  
  
[]  See Progress Note/Recertification Patient will continue to benefit from skilled PT services to modify and progress therapeutic interventions, address functional mobility deficits, address ROM deficits, address strength deficits, analyze and address soft tissue restrictions, analyze and cue movement patterns, analyze and modify body mechanics/ergonomics and assess and modify postural abnormalities to attain remaining goals. Progress toward goals / Updated goals: 
Progressing slowly with pain reduction PLAN 
[]  Upgrade activities as tolerated YES Continue plan of care  
[]  Discharge due to :   
[]  Other:   
 
Therapist: Yosvany Bustamante, PT, OCS, SCS, CSCS Date: 11/28/2018 Time: 6:30 AM  
 
 
No future appointments.

## 2018-11-28 NOTE — PROGRESS NOTES
[address] PHYSICAL THERAPY 
 [facility] [address], [facility] [address],[facility], [address] [facility] - Phone: [phone]  Fax: [phone] DISCHARGE NOTE Patient Name: Breanne Marx : 1990 Treatment/Medical Diagnosis: Chronic pain of right ankle [M25.571, G89.29] Referral Source: Mesha Scott MD    
Date of Initial Visit: 10/31/18 Attended Visits: 5 Missed Visits: 0  
SUMMARY OF TREATMENT Breanne Marx has been seen at our clinic 1x/wk for a total of 5 visits. Pt treatment has consisted of  therapeutic exercise for ankle ROM, ankle stability, and manual therapy (ankle and lumbar mobilization and deep tissue mobilization) CURRENT STATUS Pt has had a good tolerance to physical therapy treatment. She reports significantly improved level of pain and ability to ambulate. She continues to demonstrates hip instability and complain of  increase pain with prolongedd standing/walking. However, we feel that she will be able to continue to progress with her pain reduction and function independently at this point. Goal/Measure of Progress Goal Met? 1. Decrease Max pain by 50-75% to assist with ADLs Status at last Eval: 8/10 Current Status: 3/10 yes 2. Increase FOTO score to 76 % show functional improvement Status at last Eval: 64% Current Status: 70% progressing 3. Will rate >/= +5 on Global Rating of Change and be prepared to DC to HEP. Status at last Eval: na Current Status: +5 yes RECOMMENDATIONS 
DC with HEP If you have any questions/comments please contact us directly at [phone]. Thank you for allowing us to assist in the care of your patient. Therapist Signature: Keiry Plummer DPT, OCS, SCS, CSCS Date: 2018   Time: 7:27 AM

## 2018-12-27 ENCOUNTER — APPOINTMENT (OUTPATIENT)
Dept: PHYSICAL THERAPY | Age: 28
End: 2018-12-27